# Patient Record
Sex: FEMALE | Race: WHITE | Employment: UNEMPLOYED | URBAN - METROPOLITAN AREA
[De-identification: names, ages, dates, MRNs, and addresses within clinical notes are randomized per-mention and may not be internally consistent; named-entity substitution may affect disease eponyms.]

---

## 2019-09-19 ENCOUNTER — OFFICE VISIT (OUTPATIENT)
Dept: INTERNAL MEDICINE | Age: 29
End: 2019-09-19

## 2019-09-19 ENCOUNTER — HOSPITAL ENCOUNTER (OUTPATIENT)
Age: 29
Discharge: HOME | End: 2019-09-19

## 2019-09-19 VITALS
WEIGHT: 243.1 LBS | TEMPERATURE: 97.9 F | SYSTOLIC BLOOD PRESSURE: 120 MMHG | HEART RATE: 86 BPM | RESPIRATION RATE: 16 BRPM | HEIGHT: 67 IN | BODY MASS INDEX: 38.16 KG/M2 | DIASTOLIC BLOOD PRESSURE: 74 MMHG

## 2019-09-19 DIAGNOSIS — E03.8 OTHER SPECIFIED HYPOTHYROIDISM: ICD-10-CM

## 2019-09-19 DIAGNOSIS — E03.8 OTHER SPECIFIED HYPOTHYROIDISM: Primary | ICD-10-CM

## 2019-09-19 LAB
BASOPHILS ABSOLUTE: 0.04 E9/L (ref 0–0.2)
BASOPHILS RELATIVE PERCENT: 0.6 % (ref 0–2)
EOSINOPHILS ABSOLUTE: 0.06 E9/L (ref 0.05–0.5)
EOSINOPHILS RELATIVE PERCENT: 0.9 % (ref 0–6)
HCT VFR BLD CALC: 39.7 % (ref 34–48)
HEMOGLOBIN: 12.6 G/DL (ref 11.5–15.5)
IMMATURE GRANULOCYTES #: 0.01 E9/L
IMMATURE GRANULOCYTES %: 0.2 % (ref 0–5)
LYMPHOCYTES ABSOLUTE: 2.27 E9/L (ref 1.5–4)
LYMPHOCYTES RELATIVE PERCENT: 34.7 % (ref 20–42)
MCH RBC QN AUTO: 27.4 PG (ref 26–35)
MCHC RBC AUTO-ENTMCNC: 31.7 % (ref 32–34.5)
MCV RBC AUTO: 86.3 FL (ref 80–99.9)
MONOCYTES ABSOLUTE: 0.43 E9/L (ref 0.1–0.95)
MONOCYTES RELATIVE PERCENT: 6.6 % (ref 2–12)
NEUTROPHILS ABSOLUTE: 3.73 E9/L (ref 1.8–7.3)
NEUTROPHILS RELATIVE PERCENT: 57 % (ref 43–80)
PDW BLD-RTO: 12.4 FL (ref 11.5–15)
PLATELET # BLD: 208 E9/L (ref 130–450)
PMV BLD AUTO: 10.3 FL (ref 7–12)
RBC # BLD: 4.6 E12/L (ref 3.5–5.5)
TSH SERPL DL<=0.05 MIU/L-ACNC: 1.59 UIU/ML (ref 0.27–4.2)
WBC # BLD: 6.5 E9/L (ref 4.5–11.5)

## 2019-09-19 PROCEDURE — 85025 COMPLETE CBC W/AUTO DIFF WBC: CPT

## 2019-09-19 PROCEDURE — 84443 ASSAY THYROID STIM HORMONE: CPT

## 2019-09-19 PROCEDURE — 99213 OFFICE O/P EST LOW 20 MIN: CPT | Performed by: INTERNAL MEDICINE

## 2019-09-19 PROCEDURE — 99212 OFFICE O/P EST SF 10 MIN: CPT | Performed by: INTERNAL MEDICINE

## 2019-09-19 PROCEDURE — 36415 COLL VENOUS BLD VENIPUNCTURE: CPT

## 2019-09-19 RX ORDER — LEVOTHYROXINE SODIUM 0.05 MG/1
50 TABLET ORAL DAILY
COMMUNITY
End: 2019-09-20 | Stop reason: SDUPTHER

## 2019-09-19 SDOH — HEALTH STABILITY: MENTAL HEALTH: HOW OFTEN DO YOU HAVE A DRINK CONTAINING ALCOHOL?: NEVER

## 2019-09-19 ASSESSMENT — PATIENT HEALTH QUESTIONNAIRE - PHQ9
SUM OF ALL RESPONSES TO PHQ9 QUESTIONS 1 & 2: 0
2. FEELING DOWN, DEPRESSED OR HOPELESS: 0
SUM OF ALL RESPONSES TO PHQ QUESTIONS 1-9: 0
1. LITTLE INTEREST OR PLEASURE IN DOING THINGS: 0
SUM OF ALL RESPONSES TO PHQ QUESTIONS 1-9: 0

## 2019-09-19 ASSESSMENT — ENCOUNTER SYMPTOMS
NAUSEA: 0
EYE DISCHARGE: 0
DIARRHEA: 0
COUGH: 0
BLOOD IN STOOL: 0
CONSTIPATION: 0
SHORTNESS OF BREATH: 0
ABDOMINAL PAIN: 0
VOMITING: 0
SORE THROAT: 0

## 2019-09-20 PROBLEM — E03.8 OTHER SPECIFIED HYPOTHYROIDISM: Status: ACTIVE | Noted: 2019-09-20

## 2019-09-20 RX ORDER — LEVOTHYROXINE SODIUM 0.05 MG/1
50 TABLET ORAL DAILY
Qty: 30 TABLET | Refills: 5 | Status: SHIPPED
Start: 2019-09-20 | End: 2020-08-27 | Stop reason: ALTCHOICE

## 2020-04-05 ENCOUNTER — TELEPHONE (OUTPATIENT)
Dept: PRIMARY CARE CLINIC | Age: 30
End: 2020-04-05

## 2020-08-10 ENCOUNTER — TELEPHONE (OUTPATIENT)
Dept: INTERNAL MEDICINE | Age: 30
End: 2020-08-10

## 2020-08-10 NOTE — TELEPHONE ENCOUNTER
Called patient regarding question about lab work. Lab work was done in outside facility and we do not have records at this time. She will make an appointment for regular check up and to talk to  regarding financial aid.      Electronically signed by Coretta Cordova MD on 8/10/2020 at 3:36 PM

## 2020-08-26 NOTE — PROGRESS NOTES
ft4  [facility]  Internal Medicine Residency Program  ACC Note      SUBJECTIVE:  CC: had concerns including Check-Up (had lab work done, feels  weak  and tired alot ) and Edema (both knees  left knee greater in size , warmer to touch has been going on 7-8  months ago). Jinny Moody presented to the [facility] for evaluation of weakness and bilateral knee pain    She complaints of bilateral knee pain associated with warmth and swelling for the past 6 months. She stated that she was working out on treadmill to decrease weight and ever since then she started having bilateral knee pain, more on the left knee. Knee pain along with swelling has been gradually worsening and has been interfering with her day to day activities. She reports knee stiffness in the morning and exacerbation of pain while she walks. She takes ibuprofen for the pain and it helps. She also feels more weak for the past 6 months. TSH was checked this 8/2020, which was 4.6, elevated. She is on 50 mcg of levothyroxine and claim to take levothyroxine regularly and appropriately. She denies any weight gain, nausea, vomiting, headache, dizziness, chest pain, hair loss, dry skin, hoarseness of voice.     She does not have insurance; applied for financial aid; information provided to expedite the process    Review Of Systems:  General: weakness  Ears/Nose/Throat: no hearing loss, tinnitus, vertigo, nosebleed, nasal congestion, rhinorrhea, sore throat  Respiratory: no cough, pleuritic chest pain, dyspnea, or wheezing  Cardiovascular: no chest pain, angina, dyspnea on exertion, orthopnea, PND, palpitations, or claudication  Gastrointestinal: no nausea, vomiting, heartburn, diarrhea, constipation, abdominal pain, hematochezia or melena  Genitourinary: no urinary urgency, frequency, dysuria, nocturia, hesitancy, or incontinence  Musculoskeletal: bilateral knee pain  Skin: no abnormal pigmentation, rash, itching, masses, hair or nail changes    Outpatient Medications Marked as Taking for the 8/27/20 encounter (Office Visit) with Mala Antonio MD   Medication Sig Dispense Refill    levothyroxine (SYNTHROID) 75 MCG tablet Take 1 tablet by mouth daily 90 tablet 1       I have reviewed all pertinent PMHx, PSHx, FamHx, SocialHx, medications, and allergies and updated history as appropriate. OBJECTIVE:    VS: /87   Pulse 84   Temp 98 °F (36.7 °C) (Temporal)   Resp 18   Ht 5' 6\" (1.676 m)   Wt (!) 338 lb 6.4 oz (153.5 kg)   LMP 08/20/2020   BMI 54.62 kg/m²   General appearance: Alert, Awake, Oriented times 3, no distress  Lungs: Lungs clear to auscultation bilaterally. No rhonchi, crackles or wheezes  Heart: S1 S2  Regular rate and rhythm. No rub, murmur or gallop  Abdomen: Abdomen soft but obese, non-tender. non-distended BS normal. No masses, organomegaly, no guarding rebound or rigidity.   Extremities: bilateral knee swelling, L> R, pain with movement; no ROM abnormality  Neuro: alert, awake, no gross focal neurologic deficit    ASSESSMENT/PLAN:    Bilateral knee pain  Likely 2/2 injury while working on treadmill/ due to weight  Will check Xray of bilateral knee to rule out any fracture/ dislocation  Continue Ibuprofen 3 times daily as needed    Hypothyroidism  Reports more weakness  Denies any change in voice, hair fall, dry skin  Last TSH: 4.6 (8/2020)  Increased Levothyroxine from 50 to 75 mcg daily  Reinforced again on how to take levothyroxine  Will follow up FT4 and TSH in 4-5 weeks  Follow up in 5 weeks    Morbid obesity  Life style modification    Pap smear for cervical cancer screening  - [facility]    I have reviewed my findings and recommendations with Jake Fournier and Dr Roz Perkins MD PGY-2  8/27/2020 4:08 PM

## 2020-08-27 ENCOUNTER — OFFICE VISIT (OUTPATIENT)
Dept: INTERNAL MEDICINE | Age: 30
End: 2020-08-27

## 2020-08-27 VITALS
BODY MASS INDEX: 47.09 KG/M2 | HEIGHT: 66 IN | DIASTOLIC BLOOD PRESSURE: 87 MMHG | WEIGHT: 293 LBS | TEMPERATURE: 98 F | HEART RATE: 84 BPM | RESPIRATION RATE: 18 BRPM | SYSTOLIC BLOOD PRESSURE: 128 MMHG

## 2020-08-27 PROBLEM — M25.561 CHRONIC PAIN OF BOTH KNEES: Status: ACTIVE | Noted: 2020-08-27

## 2020-08-27 PROBLEM — G89.29 CHRONIC PAIN OF BOTH KNEES: Status: ACTIVE | Noted: 2020-08-27

## 2020-08-27 PROBLEM — M25.562 CHRONIC PAIN OF BOTH KNEES: Status: ACTIVE | Noted: 2020-08-27

## 2020-08-27 PROCEDURE — 99212 OFFICE O/P EST SF 10 MIN: CPT | Performed by: INTERNAL MEDICINE

## 2020-08-27 PROCEDURE — 99213 OFFICE O/P EST LOW 20 MIN: CPT | Performed by: INTERNAL MEDICINE

## 2020-08-27 RX ORDER — LEVOTHYROXINE SODIUM 0.07 MG/1
75 TABLET ORAL DAILY
Qty: 90 TABLET | Refills: 1 | Status: SHIPPED
Start: 2020-08-27 | End: 2020-11-06 | Stop reason: SDUPTHER

## 2020-08-27 ASSESSMENT — PATIENT HEALTH QUESTIONNAIRE - PHQ9
SUM OF ALL RESPONSES TO PHQ9 QUESTIONS 1 & 2: 1
SUM OF ALL RESPONSES TO PHQ QUESTIONS 1-9: 1
1. LITTLE INTEREST OR PLEASURE IN DOING THINGS: 1
2. FEELING DOWN, DEPRESSED OR HOPELESS: 0
SUM OF ALL RESPONSES TO PHQ QUESTIONS 1-9: 1

## 2020-08-27 NOTE — PROGRESS NOTES
Patient verbalized understanding of office instructions. She will call with questions or concerns. She was given discharge instructions, scripts for X-rays of knees  And lab work . All questions were fully answered. Printed AVS given o pt.

## 2020-08-27 NOTE — PROGRESS NOTES
[facility]  Internal Medicine Clinic     Attending Physician Statement  I have discussed the case, including pertinent history and exam findings with the resident. I have seen and examined the patient and the key elements of the encounter have been performed by me. I agree with the assessment, plan and orders as documented by the resident. I have reviewed all pertinent PMHx, PSHx, FamHx, SocialHx, medications, and allergies and updated history as appropriate. Patient here for routine follow up of medical problems. Remainder of medical problems per resident's note. 1. Hypothyroidism with tiredness and weight gain. TSH elevated (results scanned). Would increase synthroid and follow up. 2. Bilateral knee pain after increase in exercise activity, mild swelling, (-) fluid, signs of infection, (-) ant post drawer sign. Discussed rest, prn nsaid. May be 2/2 to sudden increase in activity + weight gain. Flores Will MD  8/27/2020 3:49 PM

## 2020-08-27 NOTE — PATIENT INSTRUCTIONS
Discharge Instructions:   Be compliant with medications   Start taking Levothyroxine 75 mcg daily with one glass of water in empty stomach early in the morning and do not eat one hour after taking the medicine   Please have Xray knee done   Please have labs done before coming to next visit   Follow up in 5 weeks, or sooner if symptoms get worse or do not resolve    Patient Education       ÞÕæÑ ÇáÏÑÞíÉ: ÅÑÔÇÏÇÊ ÇáÑÚÇíÉ  Hypothyroidism: Care Instructions  ÅÑÔÇÏÇÊ ÇáÑÚÇíÉ ÇáÎÇÕÉ Èß  ÃäÊ ãÕÇÈ ÈÞÕæÑ ÇáÏÑÞíÉ¡ íÚäí Ðáß Ãä ÌÓãß áÇ íÝÑÒ ãÇ íßÝí ãä ÇáåÑãæä ÇáÏÑÞí. íÓÇÚÏ åÐÇ ÇáåÑãæä Ýí ÇÓÊÎÏÇã ÌÓãß ááØÇÞÉ. ÅÐÇ ßÇä ãÓÊæì ÇáÏÑÞíÉ áÏíß ãäÎÝÖðÇ¡ ÝÞÏ ÊÔÚÑ NPJZXA¡ Ãæ ÊÕÇÈ NZOTHQWO¡ Ãæ íÒíÏ ÖÛØ ÇáÏãÇÁ áÏíß¡ Ãæ ÊÕÈÍ ÈÔÑÊß ÌÇÝÉ¡ Ãæ ÊÚÇäí ãä ãÔßáÇÊ Ýí ÇáÐÇßÑÉ. ßãÇ ÞÏ ÊõÕÇÈ ÈÇáÈÑÏ ÈÓåæáÉ¡ ÍÊì ÚäÏãÇ íßæä ÇáØÞÓ ÏÇÝÆðÇ. ÈÇáäÓÈÉ ááÓíÏÇÊ ÇááæÇÊí íßæä ãÓÊæì ÇáÏÑÞíÉ ãäÎÝÖðÇ áÏíåä ÞÏ Êßæä ÇáÏæÑÉ ÇáÔåÑíÉ áÏíåä ÛÒíÑÉ. íÊã ÇÓÊÎÏÇã ÝÍÕ Ïã áãÚÑÝÉ ãÓÊæì ÇáåÑãæä ÇáãäÈå ááÏÑÞíÉ (TSH) ááÊÍÞÞ ãä æÌæÏ ÞÕæÑ ÇáÏÑÞíÉ. ÞÏ íÚäí ãÓÊæì TSH ÇáãÑÊÝÚ Ãä ÇáÏÑÞíÉ áÏíß ãäÎÝÖÉ. ÚäÏãÇ íßæä ÌÓãß áÇ íÝÑÒ ãÇ íßÝí ãä ÇáåÑãæä ÇáÏÑÞí¡ ÊÑÊÝÚ ãÓÊæíÇÊ TSH Ýí ãÍÇæáÉ áÏÝÚ ÇáÌÓã áÅäÊÇÌ ÇáãÒíÏ. ÚáÇÌ ÞÕæÑ ÇáÏÑÞíÉ åæ ÊäÇæá ÃÞÑÇÕ ÇáåÑãæä ÇáÏÑÞí. íÌÈ Ãä ÊÔÚÑ ÈÇáÊÍÓä Ýí ÛÖæä ÃÓÈæÚ Ãæ ÇËäíä. áßä íãßä Ãä íÓÊÛÑÞ ÇáÃãÑ ÚÏÉ ÃÔåÑ áÊÌÏ ÊÛíÑÇÊ Ýí ãÓÊæì TSH. ÓÊÍÊÇÌ Åáì ÇáÞíÇã ÈÒíÇÑÇÊ ãäÊÙãÉ áØÈíÈß ááÊÃßÏ Ãäß ÊÊäÇæá ÇáÌÑÚÉ ÇáÕÍíÍÉ ãä ÇáÏæÇÁ. ÃÛáÈ ÇáÃÔÎÇÕ íÍÊÇÌæä Åáì ÇáÇÓÊãÑÇÑ Úáì ÚáÇÌ áÈÞíÉ ÍíÇÊåã. ÓíÊÚíä Úáíß ÒíÇÑÉ ØÈíÈß ÈÇäÊÙÇã áÅÌÑÇÁ ÇÎÊÈÇÑÇÊ ÇáÏãÇÁ æÇáÊÃßÏ Ãäß ÈÍÇáÉ ÌíÏÉ. ÊõÚÏ ÑÚÇíÉ ÇáãÊÇÈÚÉ ÌÒÁðÇ ÃÓÇÓíðÇ Ýí ÚáÇÌß æÓáÇãÊß. ÝÚáíß ÇáÍÑÕ Úáì ÊÑÊíÈ ÌãíÚ ãæÇÚíÏ ÒíÇÑÉ ÇáØÈíÈ BRLAINDNG ÈåÇ¡ ULHHAGKP ÈØÈíÈß ÚäÏ IUPMPZLT ãä Ãí ãÔßáÇÊ. æãä ÇáÌíÏ ÃíÖðÇ Ãä ÊÚÑÝ äÊÇÆÌ FACISQBO æßÐáß OOSKKWWK ÈÞÇÆãÉ ÇáÃÏæíÉ ÇáÊí YGOYGLYZ. ßíÝ íãßäß ÇáÇÚÊäÇÁ ÈäÝÓß Ýí FAZGJV¿   ÊäÇæá ÃÏæíÉ ÇáåÑãæä ÇáÏÑÞí ÊãÇãðÇ ÍÓÈ ÊæÌíåÇÊ ÇáØÈíÈ. ÇÊÕá ÈÇáØÈíÈ ÅÐÇ ßäÊ ÊÚÊÞÏ Ãäß ÊæÇÌå ãÔßáÉ ÊÊÚáÞ ÈÇáÏæÇÁ.  ÃÛáÈ ÇáÃÔÎÇÕ áÇ íÍÏË áÏíåã ÂËÇÑ ÌÇäÈíÉ ÅÐÇ YQJXBZY ÇáßãíÉ ÇáÕÍíÍÉ ãä ÇáÏæÇÁ ÈÇäÊÙÇã. o ÊäÇæá ÇáÏæÇÁ ÞÈá 30 ÏÞíÞÉ ãä ÇáÅÝØÇÑ¡ æáÇ LMNWVYC ãÚ ÇáßÇáÓíæã¡ Ãæ ÇáÝíÊÇãíäÇÊ¡ Ãæ ÇáÍÏíÏ. o áÇ ÊÊäÇæá ÌÑÚÇÊ ÅÖÇÝíÉ ãä ÏæÇÁ ÇáÏÑÞíÉ ÇáÎÇÕ Èß. áä íÓÇÚÏß Ðáß Ýí ÇáÊÍÓä ÃÓÑÚ Úáì NBJPQSU¡ æÞÏ íÊÓÈÈ Ðáß Ýí ÍÏæË ÂËÇÑ ÌÇäÈíÉ. o ÅÐÇ äÓíÊ ÊäÇæá ÌÑÚÉ¡ ÝáÇ ÊÊäÇæá ÌÑÚÊíä ãä ÇáÏæÇÁ äåÇÆíðÇ. ÊäÇæá ÌÑÚÊß ÇáÚÇÏíÉ Ýí Çáíæã ÇáÊÇáí.  ÃÎÈÑ ØÈíÈß Èßá ÇáãäÊÌÇÊ ÇáÊí ODKVDQEC ÓæÇÁ ÈæÕÝÉ ØÈíÉ¡ Ãæ ãäÊÌÇÊ ÇáÃÚÔÇÈ¡ Ãæ ÇáãÊÇÍÉ ÈÏæä æÕÝÉ ØÈíÉ.  ÇÚÊä ÈäÝÓß. ÇÊÈÚ ÍãíÉ ÛÐÇÆíÉ ÕÍíÉ¡ æÇÍÕá Úáì Çáäæã ÇáßÇÝí¡ æãÇÑÓ ÊãÇÑíä ÈÇäÊÙÇã. ãÊì íäÈÛí áß JUTTLEQ áØáÈ ÇáãÓÇÚÏÉ¿  íÊã CRLYIQQ [id number] ÚäÏ HDYPAPFJ Ãäß ÈÍÇÌÉ Åáì ÑÚÇíÉ ØÇÑÆÉ. Úáì ÓÈíá PFCLTZ¡ ÇÊÕá Ýí PJHBNYI ÇáÊÇáíÉ:   ÅÐÇ ÃÕÈÊ ÈÇáÅÛãÇÁ (ÝÞÏÇä ÇáæÚí).  ÅÐÇ ßäÊ ÊÚÇäí ãä ÕÚæÈÉ ÔÏíÏÉ Ýí ÇáÊäÝÓ.  ÅÐÇ ßÇä ÇáäÈÖ áÏíß ÔÏíÏ ÇáÈØÁ (ÃÞá ãä 60 äÈÖÉ Ýí ÇáÏÞíÞÉ).  ÅÐÇ ßÇäÊ ÏÑÌÉ ÍÑÇÑÉ ÌÓãß ãäÎÝÖÉ (95 ÏÑÌÉ ÝåÑäåÇíÊ Ãæ ÃÞá). Úáíß ÇáÇÊÕÇá ÈØÈíÈß Úáì ÇáÝæÑ Ãæ ØáÈ ÑÚÇíÉ ØÈíÉ ÝæÑíÉ Ýí ZCNRNUR ÇáÊÇáíÉ:   ÅÐÇ ßäÊ ÊÔÚÑ ÈÅÌåÇÏ¡ Ãæ Îãæá¡ Ãæ ÖÚÝ.  ÅÐÇ ßÇäÊ áÏíß ãÔßáÉ Ýí ÊÐßÑ ÇáÃÔíÇÁ Ãæ Ýí ÇáÊÑßíÒ.  ÅÐÇ áã ÊÔÚÑ ÈÊÍÓä ÈÚÏ ÃÓÈæÚíä ãä ÈÏÁ ÊäÇæá ÇáÏæÇÁ. Úáíß ãÑÇÞÈÉ Ãí ÊÛíÑÇÊ ÊØÑÃ Úáì ÕÍÊß Úä ßËÈ XTIUKT Úáì MCINHLT ÈØÈíÈß ÅÐÇ ßÇäÊ áÏíß Ãí ãÔßáÉ. Ãíä íãßäß ãÚÑÝÉ ÇáãÒíÏ¿  ÇäÊÞÇá Åáì   [email or web address]  ÃÏÎá Luis A.Dumas Ýí ãÑÈÚ ÇáÈÍË áãÚÑÝÉ ÇáãÒíÏ Íæá \"ÞÕæÑ ÇáÏÑÞíÉ: ÅÑÔÇÏÇÊ ÇáÑÚÇíÉ. \"  Caleen Addison IQHHFJCL ãä: 29 ÊãæÒ 2019               äÓÎÉ TFYLRZU: 12.5  © 3887-4873 [employer]. Êã ÊÚÏíá ÅÑÔÇÏÇÊ ÇáÑÚÇíÉ ÈãæÌÈ ÊÑÎíÕ ÕÇÏÑ ãä ÇÎÊÕÇÕí ÇáÑÚÇíÉ ÇáÕÍíÉ ÇáÎÇÕ Èß. ÅÐÇ ßÇäÊ áÏíß ÃÓÆáÉ JPUTD ÈÍÇáÉ ãÑÖíÉ Ãæ ÈåÐå ÇáÊÚáíãÇÊ¡ ÝÇÍÑÕ Úáì ÇáÑÌæÚ ÏÇÆãðÇ Åáì ÇÎÊÕÇÕí ÇáÑÚÇíÉ ÇáÕÍíÉ. ÊõÎáí ÔÑßÉ Nymirum PMOLUDWUH Úä Ãí ÖãÇä Ãæ ÇáÊÒÇã íÊÚáÞ WLTPQQFGT áåÐå VCCPWSVMC.

## 2020-10-28 ENCOUNTER — HOSPITAL ENCOUNTER (OUTPATIENT)
Age: 30
Discharge: HOME | End: 2020-10-28

## 2020-10-28 PROCEDURE — 84443 ASSAY THYROID STIM HORMONE: CPT

## 2020-10-28 PROCEDURE — 84439 ASSAY OF FREE THYROXINE: CPT

## 2020-10-28 PROCEDURE — 36415 COLL VENOUS BLD VENIPUNCTURE: CPT

## 2020-10-29 LAB
T4 FREE: 1.53 NG/DL (ref 0.93–1.7)
TSH SERPL DL<=0.05 MIU/L-ACNC: 1.27 UIU/ML (ref 0.27–4.2)

## 2020-11-06 ENCOUNTER — OFFICE VISIT (OUTPATIENT)
Dept: INTERNAL MEDICINE | Age: 30
End: 2020-11-06

## 2020-11-06 VITALS
RESPIRATION RATE: 20 BRPM | HEART RATE: 80 BPM | TEMPERATURE: 98 F | OXYGEN SATURATION: 99 % | SYSTOLIC BLOOD PRESSURE: 129 MMHG | HEIGHT: 66 IN | BODY MASS INDEX: 37.14 KG/M2 | WEIGHT: 231.1 LBS | DIASTOLIC BLOOD PRESSURE: 82 MMHG

## 2020-11-06 PROCEDURE — 6360000002 HC RX W HCPCS

## 2020-11-06 PROCEDURE — 99213 OFFICE O/P EST LOW 20 MIN: CPT | Performed by: INTERNAL MEDICINE

## 2020-11-06 PROCEDURE — 99212 OFFICE O/P EST SF 10 MIN: CPT | Performed by: INTERNAL MEDICINE

## 2020-11-06 PROCEDURE — G0008 ADMIN INFLUENZA VIRUS VAC: HCPCS

## 2020-11-06 PROCEDURE — 90686 IIV4 VACC NO PRSV 0.5 ML IM: CPT

## 2020-11-06 RX ORDER — LEVOTHYROXINE SODIUM 0.07 MG/1
75 TABLET ORAL DAILY
Qty: 90 TABLET | Refills: 1 | Status: SHIPPED
Start: 2020-11-06 | End: 2021-04-30 | Stop reason: SDUPTHER

## 2020-11-06 NOTE — PATIENT INSTRUCTIONS
ÊÍÖíÑ ÇáØÚÇã¡ ÝÇáÚËæÑ Úáì ÎØÉ ÊÊÖãä ÞæÇÆã ææÕÝÇÊ íæãíÉ ÞÏ Êßæä ÇáÃÝÖá ÈÇáäÓÈÉ áß.  ÇÓÃá ØÈíÈß Úä ãÎÊÕíä ÕÍííä ÂÎÑíä íãßäåã ãÓÇÚÏÊß Ýí ÊÍÞíÞ ÃåÏÇÝß áÅäÞÇÕ ÇáæÒä. o íãßä Ãä íÓÇÚÏß ÃÎÕÇÆí ÇáÊÛÐíÉ Ýí Úãá ÊÛííÑÇÊ ÕÍíÉ Ýí äÙÇãß ÇáÛÐÇÆí. o íãßä Ãä íÓÇÚÏß ÃÎÕÇÆí ÇáÊãÇÑíä ÇáÑíÇÖíÉ Ãæ ÇáãÏÑÈ ÇáÔÎÕí Ýí ÊØæíÑ ÈÑäÇãÌ ÊãÇÑíä Âãä æÝÚÇá. o íãßä Ãä íÓÇÚÏß ÇáãÑÔÏ Çæ ÇáØÈíÈ ÇáäÝÓí Ýí ÇáÊÕÏí Åáì ãÔßáÇÊ ãËá OERWZIHI¡ Ãæ XJMYJRL¡ Ãæ RNVNGVXJ ÇáÃÓÑíÉ ÇáÊí íãßä Ãä ÊÕÚÈ ãä ÊÑßíÒß Úáì ÅäÞÇÕ ÇáæÒä.  ÝßÑ Ýí YOHUSKGG ÈãÌãæÚÉ ÏÚã ááÃÔÎÇÕ ÇáÐíä íÍÇæáæä ÅäÞÇÕ æÒäåã. íãßä Ãä íÞÊÑÍ ØÈíÈß ãÌãæÚÇÊ Ýí ãäØÞÊß. Ãíä íãßäß ãÚÑÝÉ ÇáãÒíÏ¿  ÇäÊÞÇá Åáì   [email or web address]  ÃÏÎá U36 Ýí ãÑÈÚ ÇáÈÍË áãÚÑÝÉ ÇáãÒíÏ Íæá \"ÈÏÁ ÎØÉ áÅäÞÇÕ ÇáæÒä: ÅÑÔÇÏÇÊ ÇáÑÚÇíÉ. \"  ÓÇÑò XHDVFPKV ãä: 11 ßÇäæä ÇáÃæá 2019               äÓÎÉ ÇáãÍÊæì: 12.6  © 4808-2543 [employer]. Êã ÊÚÏíá ÅÑÔÇÏÇÊ ÇáÑÚÇíÉ ÈãæÌÈ ÊÑÎíÕ ÕÇÏÑ ãä ÇÎÊÕÇÕí ÇáÑÚÇíÉ ÇáÕÍíÉ ÇáÎÇÕ Èß. ÅÐÇ ßÇäÊ áÏíß ÃÓÆáÉ MPNJN ÈÍÇáÉ ãÑÖíÉ Ãæ ÈåÐå ÇáÊÚáíãÇÊ¡ ÝÇÍÑÕ Úáì ÇáÑÌæÚ ÏÇÆãðÇ Åáì ÇÎÊÕÇÕí ÇáÑÚÇíÉ ÇáÕÍíÉ. ÊõÎáí ÔÑßÉ [facility] [id number] Úä Ãí ÖãÇä Ãæ ÇáÊÒÇã íÊÚáÞ [id number] áåÐå [id number].

## 2020-11-06 NOTE — PROGRESS NOTES
[facility]  Internal Medicine Clinic    Attending Physician Statement:  Luc Cole. Franchesca Johnson M.D., F.A.C.P. I have discussed the case, including pertinent history and exam findings with the resident. I agree with the assessment, plan and orders as documented by the resident. Patient here for routine follow up of medical problems. Established in clinic in 2019    Last appt in August 2020    Recently increased levothyroxine with improved symptoms    Needs levothyroxine refill today    TSH and Free T4 within normal range      Hypothyroidism    Recently adjusted levothyroxine    Symptoms of hypothyroidism improved   Continue current dosing of medications       Obesity- lifestyle changes continued   Intentional weight loss noted    Self-management goal set during encounter     HealthCare Vaccination    Previously completed influenza vaccine    Counseled on Women's health referral and PAP assessment needs- patient deferred      Remainder of medical problems as per resident note.
Patient given instruction by Dr Lea Flores. 6 month follow up scheduled. Printed AVS given to patient.
LMP 11/02/2020   SpO2 99%   BMI 37.30 kg/m²   General appearance: Alert, Awake, Oriented times 3, no distress  Lungs: Lungs clear to auscultation bilaterally. No rhonchi, crackles or wheezes  Heart: S1 S2  Regular rate and rhythm. No rub, murmur or gallop  Abdomen: Abdomen soft but obese, non-tender. non-distended BS normal. No masses, organomegaly, no guarding rebound or rigidity. Extremities: No edema, Peripheral pulses palpable 2/4  Neuro: alert, awake, no gross focal neurologic deficit    ASSESSMENT/PLAN:    Hypothyroidism  Stated that she is feeling much better  Levothyroxine increased from 50 mcg to 75 mcg in (8/2020), tolerating well  Last TSH: 4.6 (8/2020)-->1.2 (10/2020)  FT4 1.2 (9/20)--> 1.53, wnl  Will check lipid panel, BMP    Obesity class II  BMI 37.30  Lost 8 lbs in the last 6 months  Life style modification  Exercising regularly    Bilateral knee pain  Resolved    Screening for diabetes mellitus  Will check HEMOGLOBIN A1C    Encounter for vitamin deficiency screening  Will check Vitamin D 25 Hydroxy level    Health maintenance:  Discussed about PAP smear.  She stated that she would like to sort out the insurance issues first and then will see OB/GYN  FLU vaccination: 11/6/2020    I have reviewed my findings and recommendations with Pablo Zelaya and Dr Mary Wallace MD PGY-2  11/6/2020 10:40 AM

## 2021-04-09 ENCOUNTER — HOSPITAL ENCOUNTER (OUTPATIENT)
Age: 31
Discharge: HOME | End: 2021-04-09

## 2021-04-09 DIAGNOSIS — Z13.21 ENCOUNTER FOR VITAMIN DEFICIENCY SCREENING: ICD-10-CM

## 2021-04-09 DIAGNOSIS — Z13.1 SCREENING FOR DIABETES MELLITUS: ICD-10-CM

## 2021-04-09 DIAGNOSIS — E03.9 HYPOTHYROIDISM, UNSPECIFIED TYPE: ICD-10-CM

## 2021-04-09 LAB
ANION GAP SERPL CALCULATED.3IONS-SCNC: 9 MMOL/L (ref 7–16)
BUN BLDV-MCNC: 8 MG/DL (ref 6–20)
CALCIUM SERPL-MCNC: 9.1 MG/DL (ref 8.6–10.2)
CHLORIDE BLD-SCNC: 103 MMOL/L (ref 98–107)
CHOLESTEROL, TOTAL: 179 MG/DL (ref 0–199)
CO2: 25 MMOL/L (ref 22–29)
CREAT SERPL-MCNC: 0.7 MG/DL (ref 0.5–1)
GFR AFRICAN AMERICAN: >60
GFR NON-AFRICAN AMERICAN: >60 ML/MIN/1.73
GLUCOSE BLD-MCNC: 97 MG/DL (ref 74–99)
HBA1C MFR BLD: 4.7 % (ref 4–5.6)
HDLC SERPL-MCNC: 62 MG/DL
LDL CHOLESTEROL CALCULATED: 107 MG/DL (ref 0–99)
POTASSIUM SERPL-SCNC: 4.2 MMOL/L (ref 3.5–5)
SODIUM BLD-SCNC: 137 MMOL/L (ref 132–146)
TRIGL SERPL-MCNC: 51 MG/DL (ref 0–149)
VITAMIN D 25-HYDROXY: 17 NG/ML (ref 30–100)
VLDLC SERPL CALC-MCNC: 10 MG/DL

## 2021-04-09 PROCEDURE — 80048 BASIC METABOLIC PNL TOTAL CA: CPT

## 2021-04-09 PROCEDURE — 80061 LIPID PANEL: CPT

## 2021-04-09 PROCEDURE — 83036 HEMOGLOBIN GLYCOSYLATED A1C: CPT

## 2021-04-09 PROCEDURE — 82306 VITAMIN D 25 HYDROXY: CPT

## 2021-04-09 PROCEDURE — 36415 COLL VENOUS BLD VENIPUNCTURE: CPT

## 2021-04-30 ENCOUNTER — OFFICE VISIT (OUTPATIENT)
Dept: INTERNAL MEDICINE | Age: 31
End: 2021-04-30

## 2021-04-30 VITALS
BODY MASS INDEX: 36.16 KG/M2 | WEIGHT: 225 LBS | TEMPERATURE: 98 F | DIASTOLIC BLOOD PRESSURE: 80 MMHG | HEART RATE: 86 BPM | HEIGHT: 66 IN | OXYGEN SATURATION: 100 % | SYSTOLIC BLOOD PRESSURE: 120 MMHG | RESPIRATION RATE: 16 BRPM

## 2021-04-30 DIAGNOSIS — U07.1 ASYMPTOMATIC COVID-19 VIRUS INFECTION: ICD-10-CM

## 2021-04-30 DIAGNOSIS — R53.83 FATIGUE, UNSPECIFIED TYPE: Primary | ICD-10-CM

## 2021-04-30 DIAGNOSIS — R00.2 PALPITATION: ICD-10-CM

## 2021-04-30 DIAGNOSIS — E03.9 HYPOTHYROIDISM, UNSPECIFIED TYPE: ICD-10-CM

## 2021-04-30 DIAGNOSIS — E66.9 OBESITY (BMI 35.0-39.9 WITHOUT COMORBIDITY): ICD-10-CM

## 2021-04-30 DIAGNOSIS — Z00.00 HEALTHCARE MAINTENANCE: ICD-10-CM

## 2021-04-30 DIAGNOSIS — E55.9 VITAMIN D DEFICIENCY: ICD-10-CM

## 2021-04-30 PROCEDURE — 99213 OFFICE O/P EST LOW 20 MIN: CPT | Performed by: INTERNAL MEDICINE

## 2021-04-30 PROCEDURE — 99212 OFFICE O/P EST SF 10 MIN: CPT | Performed by: INTERNAL MEDICINE

## 2021-04-30 RX ORDER — LEVOTHYROXINE SODIUM 0.07 MG/1
75 TABLET ORAL DAILY
Qty: 90 TABLET | Refills: 1 | Status: SHIPPED | OUTPATIENT
Start: 2021-04-30 | End: 2022-10-12

## 2021-04-30 RX ORDER — ERGOCALCIFEROL 1.25 MG/1
50000 CAPSULE ORAL WEEKLY
Qty: 4 CAPSULE | Refills: 0 | Status: SHIPPED | OUTPATIENT
Start: 2021-04-30 | End: 2021-11-09 | Stop reason: ALTCHOICE

## 2021-04-30 ASSESSMENT — ENCOUNTER SYMPTOMS
COUGH: 0
BLOOD IN STOOL: 0
WHEEZING: 0
CHEST TIGHTNESS: 0
CONSTIPATION: 0
DIARRHEA: 0
ABDOMINAL PAIN: 0
VOMITING: 0
EYE DISCHARGE: 0
NAUSEA: 0
EYE ITCHING: 0
TROUBLE SWALLOWING: 0
RHINORRHEA: 0
SHORTNESS OF BREATH: 0

## 2021-04-30 ASSESSMENT — PATIENT HEALTH QUESTIONNAIRE - PHQ9
SUM OF ALL RESPONSES TO PHQ QUESTIONS 1-9: 0

## 2021-04-30 NOTE — PROGRESS NOTES
OBJECTIVE:    VS:   Vitals:    04/30/21 0951   BP: 120/80   Site: Right Upper Arm   Position: Sitting   Cuff Size: Large Adult   Pulse: 86   Resp: 16   Temp: 98 °F (36.7 °C)   TempSrc: Oral   SpO2: 100%   Weight: 225 lb (102.1 kg)   Height: 5' 6\" (1.676 m)     Physical Exam  Vitals signs reviewed. Constitutional:       Appearance: She is well-developed. HENT:      Head: Normocephalic and atraumatic. Right Ear: External ear normal.      Left Ear: External ear normal.   Eyes:      Conjunctiva/sclera: Conjunctivae normal.   Neck:      Musculoskeletal: Normal range of motion and neck supple. Cardiovascular:      Rate and Rhythm: Normal rate and regular rhythm. Pulses: Normal pulses. Heart sounds: Normal heart sounds. No murmur. No friction rub. No gallop. Comments: Normal S2 split, resolved with deep breath  Pulmonary:      Effort: Pulmonary effort is normal.      Breath sounds: Normal breath sounds. Abdominal:      General: Bowel sounds are normal.      Palpations: Abdomen is soft. Skin:     General: Skin is warm and dry. Capillary Refill: Capillary refill takes less than 2 seconds. Neurological:      Mental Status: She is alert and oriented to person, place, and time. ASSESSMENT/PLAN:  1. Fatigue, unspecified type  Likely 2/2 obesity, fasting, vit D deficiency vs covid19 sequela  Will rule out anemia  -     CBC; Future  -     IRON AND TIBC; Future  -     FERRITIN; Future  2. Hypothyroidism, unspecified type  -     levothyroxine (SYNTHROID) 75 MCG tablet; Take 1 tablet by mouth daily, Disp-90 tablet, R-1Normal  -     TSH without Reflex; Future  3. Asymptomatic COVID-19 virus infection  4. Palpitation  Likely 2/2 fasting  Will consider Holter if not resolved   5. Vitamin D deficiency  -     vitamin D (ERGOCALCIFEROL) 1.25 MG (12081 UT) CAPS capsule; Take 1 capsule by mouth once a week, Disp-4 capsule, R-0Normal    6. Obesity ?  OPAL   Consider sleep study  Discuss weight loss referral, pt refused     7. Healthcare maintenance  Will bring vaccination record next time  Refused HIV, HCV, low risk as well   Refuse PAPsmear. Info given       RTC:  In a month       I have reviewed my findings and recommendations with Migel Neumann and Dr. Flako Alberto.     Yves Fenton MD   4/30/2021 12:07 PM

## 2021-04-30 NOTE — PATIENT INSTRUCTIONS
- Please check blood work to rule out anemia  - We will write Vitamin D high dose for 4 weeks and see you in our office in a month  - If not resolve, we may consider Sleep study and Holter Monitoring   - Please also bring vaccination record with you next time     Patient Education        Learning About Cervical Cancer Screening  What is a cervical cancer screening test?     The cervix is the lower part of the uterus that opens into the vagina. Cervical cancer screening tests check the cells on the cervix for changes that could lead to cancer. Two tests can be used to screen for cervical cancer. They may be used alone or together. A Pap test.   This test looks for changes in the cells of the cervix. Some of these cell changes could lead to cancer. A human papillomavirus (HPV) test.   This test looks for the HPV virus. Some high-risk types of HPV can cause cell changes that could lead to cervical cancer. When should you have a screening test?  If you have a cervix, you may need cervical cancer screening. Screening will depend on many things. Ages 24 to 34  Screening options for these ages include:  · A Pap test. If your results are normal, you can wait 3 years to have another test.  · An HPV test beginning at age 22. If your results are negative, you can wait 5 years to have another test.  Ages 27 to 59  Screening options for these ages include:  · A Pap test. If your results are normal, you can wait 3 years to have another test.  · An HPV test. If your results are negative, you can wait 5 years to have another test.  · A Pap test and an HPV test. If your results are normal, you can wait 5 years to be tested again. Ages 72 and older  If you are age 72 or older and you've always had normal screening results, you may not need screening. Talk to your doctor. What do the results of cervical cancer screening mean?   Your test results may be normal. Or the results may show minor or serious changes to the cells on your

## 2021-05-03 ENCOUNTER — HOSPITAL ENCOUNTER (OUTPATIENT)
Age: 31
Discharge: HOME | End: 2021-05-03

## 2021-05-03 DIAGNOSIS — E03.9 HYPOTHYROIDISM, UNSPECIFIED TYPE: ICD-10-CM

## 2021-05-03 DIAGNOSIS — R53.83 FATIGUE, UNSPECIFIED TYPE: ICD-10-CM

## 2021-05-03 LAB
FERRITIN: 43 NG/ML
HCT VFR BLD CALC: 40.4 % (ref 34–48)
HEMOGLOBIN: 13.3 G/DL (ref 11.5–15.5)
IRON SATURATION: 16 % (ref 15–50)
IRON: 52 MCG/DL (ref 37–145)
MCH RBC QN AUTO: 28 PG (ref 26–35)
MCHC RBC AUTO-ENTMCNC: 32.9 % (ref 32–34.5)
MCV RBC AUTO: 85.1 FL (ref 80–99.9)
PDW BLD-RTO: 12.4 FL (ref 11.5–15)
PLATELET # BLD: 238 E9/L (ref 130–450)
PMV BLD AUTO: 10.1 FL (ref 7–12)
RBC # BLD: 4.75 E12/L (ref 3.5–5.5)
TOTAL IRON BINDING CAPACITY: 330 MCG/DL (ref 250–450)
TSH SERPL DL<=0.05 MIU/L-ACNC: 2.61 UIU/ML (ref 0.27–4.2)
WBC # BLD: 7 E9/L (ref 4.5–11.5)

## 2021-05-03 PROCEDURE — 85027 COMPLETE CBC AUTOMATED: CPT

## 2021-05-03 PROCEDURE — 82728 ASSAY OF FERRITIN: CPT

## 2021-05-03 PROCEDURE — 83550 IRON BINDING TEST: CPT

## 2021-05-03 PROCEDURE — 84443 ASSAY THYROID STIM HORMONE: CPT

## 2021-05-03 PROCEDURE — 36415 COLL VENOUS BLD VENIPUNCTURE: CPT

## 2021-05-03 PROCEDURE — 83540 ASSAY OF IRON: CPT

## 2021-11-09 ENCOUNTER — OFFICE VISIT (OUTPATIENT)
Dept: INTERNAL MEDICINE | Age: 31
End: 2021-11-09

## 2021-11-09 VITALS
BODY MASS INDEX: 36 KG/M2 | WEIGHT: 224 LBS | RESPIRATION RATE: 16 BRPM | SYSTOLIC BLOOD PRESSURE: 122 MMHG | DIASTOLIC BLOOD PRESSURE: 88 MMHG | TEMPERATURE: 98 F | HEIGHT: 66 IN | HEART RATE: 98 BPM

## 2021-11-09 DIAGNOSIS — Z23 FLU VACCINE NEED: ICD-10-CM

## 2021-11-09 DIAGNOSIS — E55.9 VITAMIN D DEFICIENCY: Primary | ICD-10-CM

## 2021-11-09 PROCEDURE — 90686 IIV4 VACC NO PRSV 0.5 ML IM: CPT

## 2021-11-09 PROCEDURE — G0008 ADMIN INFLUENZA VIRUS VAC: HCPCS

## 2021-11-09 PROCEDURE — 99214 OFFICE O/P EST MOD 30 MIN: CPT | Performed by: INTERNAL MEDICINE

## 2021-11-09 PROCEDURE — 99212 OFFICE O/P EST SF 10 MIN: CPT | Performed by: INTERNAL MEDICINE

## 2021-11-09 PROCEDURE — 6360000002 HC RX W HCPCS

## 2021-11-09 RX ORDER — ERGOCALCIFEROL 1.25 MG/1
50000 CAPSULE ORAL WEEKLY
Qty: 6 CAPSULE | Refills: 1 | Status: ON HOLD | OUTPATIENT
Start: 2021-11-09 | End: 2022-10-14

## 2021-11-09 NOTE — PROGRESS NOTES
BMI 36.15 kg/m²   General appearance: Alert, Awake, Oriented times 3, no distress  Lungs: Lungs clear to auscultation bilaterally. No rhonchi, crackles or wheezes  Heart: S1 S2  Regular rate and rhythm. No rub, murmur or gallop  Abdomen: Abdomen soft, non-tender. non-distended BS normal. No masses, organomegaly, no guarding rebound or rigidity. Extremities: No edema, Peripheral pulses palpable 2/4  Neuro: alert, awake, no gross focal neurologic deficit  Skin: Small about 1 cm flat, pink-colored lesion of the right buttock, with central black spot, no skin discharge, bleeding, tenderness noted    ASSESSMENT/PLAN:  Hypothyroidism  No recent weight loss or hair changes  Last TSH: 4.6 (8/2020)-->1.2 (10/2020)--> 1.52, within normal limit, (7/21)  FT4 1.2 (9/20)--> 1.53, wnl  Continue levothyroxine 75 mcg daily    Fibroadenoma  Reports left breast lump  She had mammogram and breast US done in 9/11/2021 in [place], found to have left breast upper and outer quadrant 3 cm fibroadenoma  LMP was 10/22/2021  Next mammogram next march, 2022 for evaluation of fibroadenoma     Obesity class II  BMI 36.15  Weight has been stable  Life style modification discussed  Exercising regularly    Vitamin D deficiency  Continue vitamin D 50,000 international unit weekly for 6 more doses  Recheck vitamin D level in January, 2022    Skin lesion/hemangioma? Small about 1 cm flat, pink-colored lesion of the right buttock, with central black spot, no skin discharge, bleeding, tenderness noted  Advised her to contact us for dermatology referral the lesion increases in size, any bleeding, associated with pain, tenderness, asymmetry    Flu vaccine need  INFLUENZA, QUADV, 3 YRS AND OLDER, IM PF, PREFILL SYR OR SDV, 0.5ML (AFLURIA QUADV, PF)     Health maintenance:  Discussed about PAP smear.  She stated that she would like to sort out the insurance issues first and then will see OB/GYN; deferred at this time  FLU vaccination: 11/9/2021    I have reviewed my findings and recommendations with Dhiraj Narvaez and Dr Sunita Fowler MD PGY-3  11/9/2021 10:33 AM

## 2021-11-09 NOTE — PATIENT INSTRUCTIONS
Discharge Instructions:   Be compliant with medications   Please have labs done before coming to next visit   Please take vitamin D 50,000IU weekly for 6 more doses   If skin tag of the buttock increase in size, has skin changes, bleeding, pain, please contact us for dermatology referral   Follow up in 3 months, or sooner if symptoms get worse or do not resolve    Patient Education        cholecalciferol (vitamin D3)  Pronunciation:  BAL Priest role  Brand:  Landaverde D, Ddrops, Decara, Enfamil D-Vi-Sol, Replesta, Thera-D Rapid Repletion, UpSpringbaby D, Vitamin D3  What is the most important information I should know about cholecalciferol? You should not take cholecalciferol if you have had an allergic reaction to vitamin D, or if you have high levels of calcium or vitamin D in your body, or any condition that makes it hard for your body to absorb nutrients from food (malabsorption). What is cholecalciferol? Cholecalciferol is vitamin D3. Vitamin D helps your body absorb calcium. Cholecalciferol is used as a dietary supplement in people who do not get enough vitamin D in their diets to maintain adequate health. Cholecalciferol may also be used for purposes not listed in this medication guide. What should I discuss with my healthcare provider before taking cholecalciferol? You should not use cholecalciferol if you have had an allergic reaction to vitamin D, or if you have:  · high levels of vitamin D in your body (hypervitaminosis D);  · high levels of calcium in your blood (hypercalcemia); or  · any condition that makes it hard for your body to absorb nutrients from food (malabsorption). Tell your doctor if you have ever had:  · heart disease;  · kidney disease; or  · an electrolyte imbalance. Certain forms of cholecalciferol may contain ingredients you should know about, such as peanut or soybean oil, sugar, aspartame (phenylalanine), or certain food dyes.  Ask a doctor before using cholecalciferol if you have allergies, diabetes, or phenylketonuria (PKU). Too much vitamin D could harm an unborn baby or a nursing baby. Ask a doctor before using this medicine if you are pregnant or breast-feeding. Your dose needs may be different during pregnancy or while you are nursing. Do not give cholecalciferol to a child without medical advice. Your child's dose will depend on age, weight, diet, and other factors. How should I take cholecalciferol? Follow all directions on your prescription label and read all medication guides or instruction sheets. Use only the recommended dose of cholecalciferol. It may be best to take cholecalciferol after a meal, but you may take this medicine with or without food. Measure liquid medicine carefully. Use the dosing syringe provided, or use a medicine dose-measuring device (not a kitchen spoon). The chewable tablet must be chewed before you swallow it. To take a disintegrating (Quick-Melt) tablet, place it on your tongue and do not swallow the tablet whole. Allow it to dissolve in your mouth without chewing. If desired, you may drink liquid to help swallow the dissolved tablet. The cholecalciferol wafer is usually taken only once per week or once per month. Follow your doctor's dosing instructions very carefully. The wafer must be chewed before you swallow it. While using cholecalciferol, you will need frequent blood tests. You may also need x-rays. Your doctor will determine how long to treat you with this medicine. Cholecalciferol may be only part of a complete program of treatment that also includes dietary changes and taking calcium and vitamin supplements. Follow your doctor's instructions very closely. Learn about the foods you should eat to make sure you get enough calcium and vitamin D in your diet. Your cholecalciferol dose may need to be adjusted as you make changes to your diet.   Store at room temperature away from moisture, light, and heat.  What happens if I miss a dose? Take the medicine as soon as you can, but skip the missed dose if it is almost time for your next dose. Do not take two doses at one time. What happens if I overdose? Seek emergency medical attention or call the Poison Help line at 1-102.794.8054. An overdose of vitamin D can cause serious or life-threatening side effects. Overdose symptoms may include nausea, loss of appetite, thirst, urinating more or less than usual, body aches, stiffness, confusion, or irregular heartbeats. What should I avoid while taking cholecalciferol? Ask a doctor or pharmacist before taking any multivitamins, mineral supplements, or antacids while you are taking cholecalciferol. What are the possible side effects of cholecalciferol? Get emergency medical help if you have signs of an allergic reaction: hives; difficult breathing; swelling of your face, lips, tongue, or throat. Stop taking this medicine and call your doctor at once if you have:  · chest pain, feeling short of breath;  · growth problems (in a child taking cholecalciferol); or  · early signs of vitamin D overdose --weakness, metallic taste in your mouth, weight loss, muscle or bone pain, constipation, nausea, and vomiting. Less serious side effects may be more likely, and you may have none at all. This is not a complete list of side effects and others may occur. Call your doctor for medical advice about side effects. You may report side effects to FDA at 2-905-XBN-1255. What other drugs will affect cholecalciferol? Certain medications can make it harder for your body to absorb vitamin D. If you take other medications, take them at least 2 hours before or 2 hours after you take cholecalciferol. Other drugs may affect cholecalciferol, including prescription and over-the-counter medicines, vitamins, and herbal products. Tell your doctor about all your current medicines and any medicine you start or stop using.   Where can I get more information? Your pharmacist can provide more information about cholecalciferol. Remember, keep this and all other medicines out of the reach of children, never share your medicines with others, and use this medication only for the indication prescribed. Every effort has been made to ensure that the information provided by [address] is accurate, up-to-date, and complete, but no guarantee is made to that effect. Drug information contained herein may be time sensitive. [facility] information has been compiled for use by healthcare practitioners and consumers in the [place] and therefore [facility] does not warrant that uses outside of the [place] are appropriate, unless specifically indicated otherwise. [facility]'s drug information does not endorse drugs, diagnose patients or recommend therapy. [facility]'s drug information is an informational resource designed to assist licensed healthcare practitioners in caring for their patients and/or to serve consumers viewing this service as a supplement to, and not a substitute for, the expertise, skill, knowledge and judgment of healthcare practitioners. The absence of a warning for a given drug or drug combination in no way should be construed to indicate that the drug or drug combination is safe, effective or appropriate for any given patient. [facility] does not assume any responsibility for any aspect of healthcare administered with the aid of information [facility] provides. The information contained herein is not intended to cover all possible uses, directions, precautions, warnings, drug interactions, allergic reactions, or adverse effects. If you have questions about the drugs you are taking, check with your doctor, nurse or pharmacist.  Copyright 9499-3438 [address]: 4.02. Revision date: 11/3/2020. Care instructions adapted under license by [facility] ([facility]).  If you have questions about a medical condition or this instruction, always ask your healthcare professional. Bryan Flores any warranty or liability for your use of this information. Patient Education        DASH Diet: Care Instructions  Your Care Instructions     The DASH diet is an eating plan that can help lower your blood pressure. DASH stands for Dietary Approaches to Stop Hypertension. Hypertension is high blood pressure. The DASH diet focuses on eating foods that are high in calcium, potassium, and magnesium. These nutrients can lower blood pressure. The foods that are highest in these nutrients are fruits, vegetables, low-fat dairy products, nuts, seeds, and legumes. But taking calcium, potassium, and magnesium supplements instead of eating foods that are high in those nutrients does not have the same effect. The DASH diet also includes whole grains, fish, and poultry. The DASH diet is one of several lifestyle changes your doctor may recommend to lower your high blood pressure. Your doctor may also want you to decrease the amount of sodium in your diet. Lowering sodium while following the DASH diet can lower blood pressure even further than just the DASH diet alone. Follow-up care is a key part of your treatment and safety. Be sure to make and go to all appointments, and call your doctor if you are having problems. It's also a good idea to know your test results and keep a list of the medicines you take. How can you care for yourself at home? Following the DASH diet  · Eat 4 to 5 servings of fruit each day. A serving is 1 medium-sized piece of fruit, ½ cup chopped or canned fruit, 1/4 cup dried fruit, or 4 ounces (½ cup) of fruit juice. Choose fruit more often than fruit juice. · Eat 4 to 5 servings of vegetables each day. A serving is 1 cup of lettuce or raw leafy vegetables, ½ cup of chopped or cooked vegetables, or 4 ounces (½ cup) of vegetable juice. Choose vegetables more often than vegetable juice.   · Get 2 to 3 servings of low-fat and fat-free dairy each day. A serving is 8 ounces of milk, 1 cup of yogurt, or 1 ½ ounces of cheese. · Eat 6 to 8 servings of grains each day. A serving is 1 slice of bread, 1 ounce of dry cereal, or ½ cup of cooked rice, pasta, or cooked cereal. Try to choose whole-grain products as much as possible. · Limit lean meat, poultry, and fish to 2 servings each day. A serving is 3 ounces, about the size of a deck of cards. · Eat 4 to 5 servings of nuts, seeds, and legumes (cooked dried beans, lentils, and split peas) each week. A serving is 1/3 cup of nuts, 2 tablespoons of seeds, or ½ cup of cooked beans or peas. · Limit fats and oils to 2 to 3 servings each day. A serving is 1 teaspoon of vegetable oil or 2 tablespoons of salad dressing. · Limit sweets and added sugars to 5 servings or less a week. A serving is 1 tablespoon jelly or jam, ½ cup sorbet, or 1 cup of lemonade. · Eat less than 2,300 milligrams (mg) of sodium a day. If you limit your sodium to 1,500 mg a day, you can lower your blood pressure even more. · Be aware that all of these are the suggested number of servings for people who eat 1,800 to 2,000 calories a day. Your recommended number of servings may be different if you need more or fewer calories. Tips for success  · Start small. Do not try to make dramatic changes to your diet all at once. You might feel that you are missing out on your favorite foods and then be more likely to not follow the plan. Make small changes, and stick with them. Once those changes become habit, add a few more changes. · Try some of the following:  ? Make it a goal to eat a fruit or vegetable at every meal and at snacks. This will make it easy to get the recommended amount of fruits and vegetables each day. ? Try yogurt topped with fruit and nuts for a snack or healthy dessert. ? Add lettuce, tomato, cucumber, and onion to sandwiches.   ? Combine a ready-made pizza crust with low-fat mozzarella cheese and lots of vegetable toppings. Try using tomatoes, squash, spinach, broccoli, carrots, cauliflower, and onions. ? Have a variety of cut-up vegetables with a low-fat dip as an appetizer instead of chips and dip. ? Sprinkle sunflower seeds or chopped almonds over salads. Or try adding chopped walnuts or almonds to cooked vegetables. ? Try some vegetarian meals using beans and peas. Add garbanzo or kidney beans to salads. Make burritos and tacos with mashed frederick beans or black beans. Where can you learn more? Go to https://[email or web address].[email or web address]. org and sign in to your clipsync account. Enter H757 in the Pinger box to learn more about \"DASH Diet: Care Instructions. \"     If you do not have an account, please click on the \"Sign Up Now\" link. Current as of: April 29, 2021               Content Version: 13.0  © 2006-2021 Healthwise, Incorporated. Care instructions adapted under license by [facility] ([facility]). If you have questions about a medical condition or this instruction, always ask your healthcare professional. [place] [address] any warranty or liability for your use of this information.

## 2022-05-11 LAB — ABO, EXTERNAL RESULT: NORMAL

## 2022-06-09 ENCOUNTER — INITIAL PRENATAL (OUTPATIENT)
Dept: OBGYN | Age: 32
End: 2022-06-09
Payer: MEDICAID

## 2022-06-09 VITALS
BODY MASS INDEX: 40.03 KG/M2 | SYSTOLIC BLOOD PRESSURE: 131 MMHG | DIASTOLIC BLOOD PRESSURE: 82 MMHG | HEART RATE: 72 BPM | WEIGHT: 248 LBS

## 2022-06-09 DIAGNOSIS — Z3A.21 21 WEEKS GESTATION OF PREGNANCY: Primary | ICD-10-CM

## 2022-06-09 LAB
GLUCOSE URINE, POC: NORMAL
PROTEIN UA: NEGATIVE

## 2022-06-09 PROCEDURE — 81002 URINALYSIS NONAUTO W/O SCOPE: CPT | Performed by: OBSTETRICS & GYNECOLOGY

## 2022-06-09 PROCEDURE — 99204 OFFICE O/P NEW MOD 45 MIN: CPT | Performed by: OBSTETRICS & GYNECOLOGY

## 2022-06-09 PROCEDURE — 99203 OFFICE O/P NEW LOW 30 MIN: CPT

## 2022-06-09 PROCEDURE — 99203 OFFICE O/P NEW LOW 30 MIN: CPT | Performed by: OBSTETRICS & GYNECOLOGY

## 2022-06-09 RX ORDER — ASPIRIN 81 MG/1
81 TABLET, CHEWABLE ORAL DAILY
Status: ON HOLD | COMMUNITY
End: 2022-10-18 | Stop reason: HOSPADM

## 2022-06-09 NOTE — PROGRESS NOTES
Josie Escobedo is a 22-year-old  at 21 weeks and 4 days confirmed by an ultrasound on May 26, 2020 stating she was 19 weeks. She is here from [facility] and has been here since 2018. She is currently here with her father. Her  is in [facility] working as an . She is requesting a note that he be able to return home for the pregnancy and delivery. She also needs a note stating that she can fly back to [facility] while she is pregnant. She denies any anxiety depression or suicidal ideation. She has no history of any physical sexual or verbal abuse. She is a non-smoker. She had nausea and vomiting during the first 2 months but is eating better and sleeping better. 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 She has had minimal prenatal care thus far and has had only minimal blood work performed. She is taking Synthroid, baby aspirin and a multivitamin. She has noticed fetal movement periodically. Denies bleeding leaking of fluid  Heart is regular rate and rhythm  Lungs are clear  Abdomen is soft nontender no guarding no rebound  Uterus is at the umbilicus  Fetal heart tones 150s  Impression is 21 weeks gestation  Reviewed first and second trimester changes. Note provided for return to [facility] and for her  to return here  Prenatal labs were ordered  Reviewed NIPT testing however she currently has no insurance. She is too late for the quad screen.

## 2022-06-09 NOTE — PATIENT INSTRUCTIONS
Please arrive for your scheduled appointment at least 15 minutes early with your actual insurance card+ a photo ID. Also if you need any refills ordered or have questions, it may take up 48 hours to reply. Please allow ample time for your refills. Call me when you use last refill. Thank you for your cooperation. You might be having an NST at your next appt. Please eat a large snack or breakfast before coming to office. Thank youCall your primary obstetrician with bleeding, leaking of fluid, abdominal tenderness, headache, blurry vision, epigastric pain and increased urinary frequency. If you are experiencing an emergency and need immediate help, call 911 or go to go emergency room or labor and delivery. Do kick counts after dinner. Call your primary obstetrician if less than 10 kicks in 2 hours after dinner. Call your primary obstetrician with bleeding, leaking of fluid, abdominal tenderness, headache, blurry vision, epigastric pain and increased urinary frequency. Do kick counts after dinner. Call your primary obstetrician if less than 10 kicks in 2 hours after dinner. Call your primary obstetrician with bleeding, leaking of fluid, abdominal tenderness, headache, blurry vision, epigastric pain and increased urinary frequency. Do kick counts after dinner. Call your primary obstetrician if less than 10 kicks in 2 hours after dinner. Call your primary obstetrician with bleeding, leaking of fluid, abdominal tenderness, headache, blurry vision, epigastric pain and increased urinary frequency.

## 2022-06-10 ENCOUNTER — HOSPITAL ENCOUNTER (OUTPATIENT)
Age: 32
Discharge: HOME | End: 2022-06-10
Payer: MEDICAID

## 2022-06-10 DIAGNOSIS — Z3A.21 21 WEEKS GESTATION OF PREGNANCY: ICD-10-CM

## 2022-06-10 LAB
FERRITIN: 35 NG/ML
GLUCOSE TOLERANCE TEST 1 HOUR: 112 MG/DL
GLUCOSE TOLERANCE TEST 2 HOUR: 101 MG/DL
GLUCOSE TOLERANCE TEST FASTING: 79 MG/DL
HCT VFR BLD CALC: 33 % (ref 34–48)
HEMOGLOBIN: 10.9 G/DL (ref 11.5–15.5)
MCH RBC QN AUTO: 29.4 PG (ref 26–35)
MCHC RBC AUTO-ENTMCNC: 33 % (ref 32–34.5)
MCV RBC AUTO: 88.9 FL (ref 80–99.9)
PDW BLD-RTO: 13.8 FL (ref 11.5–15)
PLATELET # BLD: 198 E9/L (ref 130–450)
PMV BLD AUTO: 9.9 FL (ref 7–12)
RBC # BLD: 3.71 E12/L (ref 3.5–5.5)
WBC # BLD: 8.8 E9/L (ref 4.5–11.5)

## 2022-06-10 PROCEDURE — 82728 ASSAY OF FERRITIN: CPT

## 2022-06-10 PROCEDURE — 87340 HEPATITIS B SURFACE AG IA: CPT

## 2022-06-10 PROCEDURE — 86803 HEPATITIS C AB TEST: CPT

## 2022-06-10 PROCEDURE — 36415 COLL VENOUS BLD VENIPUNCTURE: CPT

## 2022-06-10 PROCEDURE — 86703 HIV-1/HIV-2 1 RESULT ANTBDY: CPT

## 2022-06-10 PROCEDURE — 85027 COMPLETE CBC AUTOMATED: CPT

## 2022-06-10 PROCEDURE — 82951 GLUCOSE TOLERANCE TEST (GTT): CPT

## 2022-06-10 PROCEDURE — 86762 RUBELLA ANTIBODY: CPT

## 2022-06-10 PROCEDURE — 86592 SYPHILIS TEST NON-TREP QUAL: CPT

## 2022-06-13 LAB
HEPATITIS B SURFACE ANTIGEN INTERPRETATION: NORMAL
HEPATITIS C ANTIBODY INTERPRETATION: NORMAL
HIV-1 AND HIV-2 ANTIBODIES: NORMAL
RPR: NORMAL
RUBELLA ANTIBODY IGG: NORMAL

## 2022-06-14 ENCOUNTER — TELEPHONE (OUTPATIENT)
Dept: OBGYN | Age: 32
End: 2022-06-14

## 2022-06-14 DIAGNOSIS — O99.019 ANEMIA AFFECTING FIRST PREGNANCY: Primary | ICD-10-CM

## 2022-06-14 PROBLEM — E03.8 OTHER SPECIFIED HYPOTHYROIDISM: Status: RESOLVED | Noted: 2019-09-20 | Resolved: 2022-06-14

## 2022-06-14 RX ORDER — FERROUS SULFATE 325(65) MG
325 TABLET ORAL EVERY OTHER DAY
Qty: 30 TABLET | Refills: 5 | Status: SHIPPED
Start: 2022-06-14 | End: 2022-08-24 | Stop reason: SDUPTHER

## 2022-06-14 NOTE — TELEPHONE ENCOUNTER
----- Message from Lian Calabrese DO sent at 6/14/2022  8:12 AM EDT -----  Let her know her iron is low and I have sent in a prescription to [facility] for iron supplementation to be taken every other day and I have also placed an order for a hemoglobin hematocrit to be done in 2 weeks after she starts the medication.

## 2022-06-14 NOTE — PROGRESS NOTES
Hemoglobin is low and I need her to start iron supplementation every other day for the remainder of the pregnancy and in 2 weeks from starting the medication she needs to get another blood test done to check

## 2022-06-14 NOTE — TELEPHONE ENCOUNTER
Called pt reviewed dr. Freya Calderon recommendations with pt. She is to start iron supplement qod and once on iron for 2 weeks get her labs redrawn. Pt voiced understanding.

## 2022-06-23 ENCOUNTER — TELEPHONE (OUTPATIENT)
Dept: OBGYN | Age: 32
End: 2022-06-23

## 2022-06-23 NOTE — TELEPHONE ENCOUNTER
Lara Blanco is traveling to [facility], overseas on July 13, 2022, returning August 21. She asked Nathaly Sebastian to call [facility] and request a letter stating she can travel. The flight is about 15 hours with a layover. Nathaly Sebastian is on her communication consent form  - [phone]. Lara Blanco is 23w4d. [employer]'s phone number is [phone].

## 2022-06-24 NOTE — TELEPHONE ENCOUNTER
I called Nydia Martin and she is asking for the letter to include a statement saying she does not have any complications. Is it okay to include this in the letter? Her next appointment is on august 25th. Does she need to see you before she leaves?

## 2022-08-24 ENCOUNTER — HOSPITAL ENCOUNTER (OUTPATIENT)
Age: 32
Discharge: HOME | End: 2022-08-24
Payer: MEDICAID

## 2022-08-24 DIAGNOSIS — O99.019 ANEMIA AFFECTING FIRST PREGNANCY: ICD-10-CM

## 2022-08-24 LAB
HCT VFR BLD CALC: 34.3 % (ref 34–48)
HEMOGLOBIN: 11.9 G/DL (ref 11.5–15.5)

## 2022-08-24 PROCEDURE — 85018 HEMOGLOBIN: CPT

## 2022-08-24 PROCEDURE — 36415 COLL VENOUS BLD VENIPUNCTURE: CPT

## 2022-08-24 PROCEDURE — 85014 HEMATOCRIT: CPT

## 2022-08-25 ENCOUNTER — ROUTINE PRENATAL (OUTPATIENT)
Dept: OBGYN | Age: 32
End: 2022-08-25
Payer: MEDICAID

## 2022-08-25 VITALS
SYSTOLIC BLOOD PRESSURE: 124 MMHG | DIASTOLIC BLOOD PRESSURE: 80 MMHG | WEIGHT: 234 LBS | HEART RATE: 98 BPM | BODY MASS INDEX: 37.77 KG/M2

## 2022-08-25 DIAGNOSIS — O09.33 LIMITED PRENATAL CARE IN THIRD TRIMESTER: ICD-10-CM

## 2022-08-25 DIAGNOSIS — Z3A.32 32 WEEKS GESTATION OF PREGNANCY: Primary | ICD-10-CM

## 2022-08-25 LAB
GLUCOSE URINE, POC: NORMAL
PROTEIN UA: NEGATIVE

## 2022-08-25 PROCEDURE — 99213 OFFICE O/P EST LOW 20 MIN: CPT | Performed by: OBSTETRICS & GYNECOLOGY

## 2022-08-25 PROCEDURE — 81002 URINALYSIS NONAUTO W/O SCOPE: CPT | Performed by: OBSTETRICS & GYNECOLOGY

## 2022-08-25 RX ORDER — VITAMIN A ACETATE, BETA CAROTENE, ASCORBIC ACID, CHOLECALCIFEROL, .ALPHA.-TOCOPHEROL ACETATE, DL-, THIAMINE MONONITRATE, RIBOFLAVIN, NIACINAMIDE, PYRIDOXINE HYDROCHLORIDE, FOLIC ACID, CYANOCOBALAMIN, CALCIUM CARBONATE, FERROUS FUMARATE, ZINC OXIDE, CUPRIC OXIDE 3080; 12; 120; 400; 1; 1.84; 3; 20; 22; 920; 25; 200; 27; 10; 2 [IU]/1; UG/1; MG/1; [IU]/1; MG/1; MG/1; MG/1; MG/1; MG/1; [IU]/1; MG/1; MG/1; MG/1; MG/1; MG/1
1 TABLET, FILM COATED ORAL DAILY
Qty: 90 TABLET | Refills: 3 | Status: SHIPPED | OUTPATIENT
Start: 2022-08-25 | End: 2022-11-23

## 2022-08-25 NOTE — PROGRESS NOTES
Airway  Performed by: Licha Dolan MD  Authorized by: Licha Dolan MD     Final Airway Type:  Endotracheal airway  Final Endotracheal Airway*:  ETT and DIAMOND tube  ETT Size (mm)*:  5.0  Cuff*:  Regular  Technique Used for Successful ETT Placement:  Direct laryngoscopy  Devices/Methods Used in Placement*:  Mask and Nasal Airway  Intubation Procedure*:  Preoxygenation, ETCO2, Atraumatic, Dentition Unchanged and Pharynx Clear  Insertion Site:  Oral  Blade Type*:  MAC  Blade Size*:  2  Placement Verified by: auscultation and capnometry    Glottic View*:  1 - full view of glottis  Attempts*:  1   Patient Identified, Procedure confirmed, Emergency equipment available and Safety protocols followed  Location:  OR  Urgency:  Elective  Difficult Airway: No    Indications for Airway Management:  Anesthesia  Sedation Level:  Anesthetized  Mask Difficulty Assessment:  1 - vent by mask  Performed By:  Anesthesiologist  Anesthesiologist:  Licha Dolan MD  Start Time: 3/10/2022 12:15 PM   One spray of Afrin applied to each nare. 5.0 cuffed nasal Diamond ETT inserted into right nare with minimal resistance. Direct laryngoscopy performed with MAC 2. Grade 1 view. 5.0 cuffed nasal DIAMOND advanced into trachea easily with use of Magill forceps        
Glaucoma suspect OU
Is back from [facility]. Reports her  will not be able to join her. Mother will be present for delivery. Denies HA, BLD, LO F or CTN's. Good FM.   Area under right breast is a skin tag no need to address
roiyinr prenatal  +fm  Pt denies lof vag bleeding or severe abd pain  Pt has an area would like checked underher right breast
none

## 2022-08-25 NOTE — PATIENT INSTRUCTIONS
You might be having an NST at your next appt. Please eat a large snack or breakfast before coming to office. Thank you If you are experiencing an emergency and need immediate help, call 911 or go to go emergency room or labor and delivery. If you are experiencing an emergency and need immediate help, call 911 or go to go emergency room or labor and delivery. If you are experiencing an emergency and need immediate help, call 911 or go to go emergency room or labor and delivery.

## 2022-08-29 DIAGNOSIS — O99.019 ANEMIA AFFECTING FIRST PREGNANCY: ICD-10-CM

## 2022-08-30 DIAGNOSIS — O99.019 ANEMIA AFFECTING FIRST PREGNANCY: ICD-10-CM

## 2022-08-30 RX ORDER — FERROUS SULFATE 325(65) MG
325 TABLET ORAL EVERY OTHER DAY
Qty: 30 TABLET | Refills: 5 | Status: SHIPPED
Start: 2022-08-30 | End: 2022-08-30 | Stop reason: SDUPTHER

## 2022-08-30 RX ORDER — FERROUS SULFATE 325(65) MG
325 TABLET ORAL EVERY OTHER DAY
Qty: 30 TABLET | Refills: 5 | Status: ON HOLD | OUTPATIENT
Start: 2022-08-30 | End: 2022-10-17

## 2022-08-31 ENCOUNTER — HOSPITAL ENCOUNTER (OUTPATIENT)
Age: 32
Setting detail: OBSERVATION
Discharge: HOME | End: 2022-08-31
Attending: OBSTETRICS & GYNECOLOGY | Admitting: STUDENT IN AN ORGANIZED HEALTH CARE EDUCATION/TRAINING PROGRAM
Payer: MEDICAID

## 2022-08-31 VITALS — HEART RATE: 81 BPM | DIASTOLIC BLOOD PRESSURE: 71 MMHG | TEMPERATURE: 98.2 F | SYSTOLIC BLOOD PRESSURE: 122 MMHG

## 2022-08-31 PROBLEM — O47.00 PRETERM CONTRACTIONS: Status: ACTIVE | Noted: 2022-08-31

## 2022-08-31 LAB
BACTERIA: ABNORMAL /HPF
BILIRUBIN URINE: NEGATIVE
BLOOD, URINE: NEGATIVE
CLARITY: CLEAR
COLOR: YELLOW
EPITHELIAL CELLS, UA: ABNORMAL /HPF
FETAL FIBRONECTIN: NEGATIVE
GLUCOSE URINE: NEGATIVE MG/DL
KETONES, URINE: NEGATIVE MG/DL
LEUKOCYTE ESTERASE, URINE: ABNORMAL
MUCUS: PRESENT /LPF
NITRITE, URINE: NEGATIVE
PH UA: 6.5 (ref 5–9)
PROTEIN UA: NEGATIVE MG/DL
RBC UA: ABNORMAL /HPF (ref 0–2)
SPECIFIC GRAVITY UA: 1.02 (ref 1–1.03)
UROBILINOGEN, URINE: 0.2 E.U./DL
WBC UA: ABNORMAL /HPF (ref 0–5)

## 2022-08-31 PROCEDURE — 96372 THER/PROPH/DIAG INJ SC/IM: CPT

## 2022-08-31 PROCEDURE — 6360000002 HC RX W HCPCS: Performed by: STUDENT IN AN ORGANIZED HEALTH CARE EDUCATION/TRAINING PROGRAM

## 2022-08-31 PROCEDURE — 99219 PR INITIAL OBSERVATION CARE/DAY 50 MINUTES: CPT | Performed by: NURSE PRACTITIONER

## 2022-08-31 PROCEDURE — 96361 HYDRATE IV INFUSION ADD-ON: CPT

## 2022-08-31 PROCEDURE — 81001 URINALYSIS AUTO W/SCOPE: CPT

## 2022-08-31 PROCEDURE — G0378 HOSPITAL OBSERVATION PER HR: HCPCS

## 2022-08-31 PROCEDURE — 82731 ASSAY OF FETAL FIBRONECTIN: CPT

## 2022-08-31 PROCEDURE — 2580000003 HC RX 258: Performed by: NURSE PRACTITIONER

## 2022-08-31 PROCEDURE — 96360 HYDRATION IV INFUSION INIT: CPT

## 2022-08-31 RX ORDER — SODIUM CHLORIDE, SODIUM LACTATE, POTASSIUM CHLORIDE, AND CALCIUM CHLORIDE .6; .31; .03; .02 G/100ML; G/100ML; G/100ML; G/100ML
500 INJECTION, SOLUTION INTRAVENOUS ONCE
Status: COMPLETED | OUTPATIENT
Start: 2022-08-31 | End: 2022-08-31

## 2022-08-31 RX ORDER — SODIUM CHLORIDE, SODIUM LACTATE, POTASSIUM CHLORIDE, CALCIUM CHLORIDE 600; 310; 30; 20 MG/100ML; MG/100ML; MG/100ML; MG/100ML
INJECTION, SOLUTION INTRAVENOUS CONTINUOUS
Status: DISCONTINUED | OUTPATIENT
Start: 2022-08-31 | End: 2022-08-31 | Stop reason: HOSPADM

## 2022-08-31 RX ORDER — TERBUTALINE SULFATE 1 MG/ML
0.25 INJECTION, SOLUTION SUBCUTANEOUS ONCE
Status: COMPLETED | OUTPATIENT
Start: 2022-08-31 | End: 2022-08-31

## 2022-08-31 RX ADMIN — TERBUTALINE SULFATE 0.25 MG: 1 INJECTION, SOLUTION SUBCUTANEOUS at 15:59

## 2022-08-31 RX ADMIN — SODIUM CHLORIDE, POTASSIUM CHLORIDE, SODIUM LACTATE AND CALCIUM CHLORIDE: 600; 310; 30; 20 INJECTION, SOLUTION INTRAVENOUS at 14:03

## 2022-08-31 RX ADMIN — SODIUM CHLORIDE, POTASSIUM CHLORIDE, SODIUM LACTATE AND CALCIUM CHLORIDE 1000 ML: 600; 310; 30; 20 INJECTION, SOLUTION INTRAVENOUS at 13:02

## 2022-08-31 NOTE — PROGRESS NOTES
33.3 presents with c/o generalized abdominal pain with lower back pain since 0500. Denies vb, lof,  +fetal; movement.

## 2022-08-31 NOTE — DISCHARGE INSTRUCTIONS
Home Undelivered Discharge Instructions    After Discharge Orders:    Future Appointments   Date Time Provider Wes Selfi   9/8/2022  9:40 AM Mike Vasquez DO [facility]       Call physician or midwife's office on *** for instructions. Diet:  normal diet as tolerated    Rest: normal activity as tolerated    Other instructions: Do kick counts once a day on your baby. Choose the time of day your baby is most active. Get in a comfortable lying or sitting position and time how long it takes to feel 10 kicks, twists, turns, swishes, or rolls.  Call your physician or midwife if there have not been 10 kicks in 2 hours    Call physician or midwife, return to Labor and Delivery, call 911, or go to the nearest Emergency Room if: contractions more than  6 per  1 hour

## 2022-08-31 NOTE — H&P
Department of Obstetrics and Gynecology  Labor and Delivery  Triage Note      SUBJECTIVE:  Irene Kumar is a 28 y.o. female, , Patient's last menstrual period was 2022., Estimated Date of Delivery: 10/16/22, 33w3d, here with the complaint of abdominal pain and back pain. Pt denies vb, lof, or decreased fm. Pt denies urinary sx.       Prenatal course: uneventful    Review of Systems:   Ears, nose, mouth, throat, and face: negative  Respiratory: negative  Cardiovascular: negative  Gastrointestinal: positive for abdominal pain  Genitourinary:negative  Integument/breast: negative  Hematologic/lymphatic: negative  Musculoskeletal:positive for back pain  Neurological: negative  Behavioral/Psych: negative  Endocrine: negative  Allergic/Immunologic: negative    OBJECTIVE    Vitals:  /71   Pulse 81   Temp 98.2 °F (36.8 °C) (Oral)   LMP 2022     General- alert, NAD  Skin- warm and dry, no rashes seen  Psych- normal mood and affect  Neuro- CN II-XII grossly intact  Abdomen: soft, nontender  Cervix closed/75%/-2    Fetal heart rate:         Baseline Heart Rate:  150        Accelerations:  present       Decelerations:  absent       Variability:  moderate    Contraction frequency: q2-3 minutes    ASSESSMENT:    Threatened  labor    Plan: d/w Dr. Bárbara Low  Observation  Urinalysis  Iv hydration  FFN      FFN negative  1 dose of terb given, which stopped contractions, patient feeling much better  NST reactive cat 1 tracing  SVE still closed/20/-2  Okay to be dc home, encouraged to return if cxs return or she experiences vaginal bleeding, LOF, or decreased FM    Jerel Foreman

## 2022-09-08 ENCOUNTER — ROUTINE PRENATAL (OUTPATIENT)
Dept: OBGYN | Age: 32
End: 2022-09-08
Payer: MEDICAID

## 2022-09-08 VITALS
HEART RATE: 82 BPM | WEIGHT: 260 LBS | SYSTOLIC BLOOD PRESSURE: 120 MMHG | BODY MASS INDEX: 41.97 KG/M2 | DIASTOLIC BLOOD PRESSURE: 68 MMHG

## 2022-09-08 DIAGNOSIS — Z3A.34 34 WEEKS GESTATION OF PREGNANCY: Primary | ICD-10-CM

## 2022-09-08 LAB
GLUCOSE URINE, POC: NEGATIVE
PROTEIN UA: NEGATIVE

## 2022-09-08 PROCEDURE — 81002 URINALYSIS NONAUTO W/O SCOPE: CPT | Performed by: OBSTETRICS & GYNECOLOGY

## 2022-09-08 PROCEDURE — 99213 OFFICE O/P EST LOW 20 MIN: CPT | Performed by: OBSTETRICS & GYNECOLOGY

## 2022-09-08 RX ORDER — FERROUS SULFATE 325(65) MG
325 TABLET ORAL EVERY OTHER DAY
Qty: 30 TABLET | Refills: 5 | OUTPATIENT
Start: 2022-09-08

## 2022-09-08 NOTE — PROGRESS NOTES
Was in hospital 8-31 for contractions given terbutaline IV hydration. Today no CTN's, BLD or LO F.  Good FM. Just reports pulling and stretching difficulty with ambulating.

## 2022-09-22 ENCOUNTER — ROUTINE PRENATAL (OUTPATIENT)
Dept: OBGYN | Age: 32
End: 2022-09-22
Payer: MEDICAID

## 2022-09-22 VITALS
SYSTOLIC BLOOD PRESSURE: 124 MMHG | BODY MASS INDEX: 42.61 KG/M2 | HEART RATE: 98 BPM | WEIGHT: 264 LBS | DIASTOLIC BLOOD PRESSURE: 78 MMHG

## 2022-09-22 DIAGNOSIS — Z3A.36 36 WEEKS GESTATION OF PREGNANCY: ICD-10-CM

## 2022-09-22 DIAGNOSIS — Z3A.36 36 WEEKS GESTATION OF PREGNANCY: Primary | ICD-10-CM

## 2022-09-22 DIAGNOSIS — Z3A.34 34 WEEKS GESTATION OF PREGNANCY: ICD-10-CM

## 2022-09-22 LAB
GLUCOSE URINE, POC: NEGATIVE
GLUCOSE URINE, POC: NEGATIVE
PROTEIN UA: NEGATIVE
PROTEIN UA: NEGATIVE

## 2022-09-22 PROCEDURE — 81002 URINALYSIS NONAUTO W/O SCOPE: CPT | Performed by: OBSTETRICS & GYNECOLOGY

## 2022-09-22 PROCEDURE — 99213 OFFICE O/P EST LOW 20 MIN: CPT | Performed by: OBSTETRICS & GYNECOLOGY

## 2022-09-22 NOTE — PATIENT INSTRUCTIONS
Please arrive for your scheduled appointment at least 15 minutes early with your actual insurance card+ a photo ID. Also if you need any refills ordered or have questions, it may take up 48 hours to reply. Please allow ample time for your refills. Call me when you use last refill. Thank you for your cooperation. You might be having an NST at your next appt. Please eat a large snack or breakfast before coming to office. Thank you Call your primary obstetrician with bleeding, leaking of fluid, abdominal tenderness, headache, blurry vision, epigastric pain and increased urinary frequency. If you are experiencing an emergency and need immediate help, call 911 or go to go emergency room or labor and delivery. Do kick counts after dinner. Call your primary obstetrician if less than 10 kicks in 2 hours after dinner. Call your primary obstetrician with bleeding, leaking of fluid, abdominal tenderness, headache, blurry vision, epigastric pain and increased urinary frequency.

## 2022-09-26 LAB — GROUP B STREP CULTURE: NORMAL

## 2022-09-28 ENCOUNTER — ROUTINE PRENATAL (OUTPATIENT)
Dept: OBGYN | Age: 32
End: 2022-09-28
Payer: MEDICAID

## 2022-09-28 VITALS
BODY MASS INDEX: 42.29 KG/M2 | HEART RATE: 83 BPM | WEIGHT: 262 LBS | DIASTOLIC BLOOD PRESSURE: 85 MMHG | SYSTOLIC BLOOD PRESSURE: 129 MMHG

## 2022-09-28 DIAGNOSIS — Z3A.37 37 WEEKS GESTATION OF PREGNANCY: Primary | ICD-10-CM

## 2022-09-28 LAB
GLUCOSE URINE, POC: NEGATIVE
PROTEIN UA: NEGATIVE

## 2022-09-28 PROCEDURE — 99213 OFFICE O/P EST LOW 20 MIN: CPT | Performed by: OBSTETRICS & GYNECOLOGY

## 2022-09-28 PROCEDURE — 81002 URINALYSIS NONAUTO W/O SCOPE: CPT | Performed by: OBSTETRICS & GYNECOLOGY

## 2022-09-28 NOTE — PROGRESS NOTES
No S&S of PIH. No CTN's, BLD or LO F.   Reviewed GBS negative no exam today reviewed labor delivery and when to go to the hospital.

## 2022-10-03 ENCOUNTER — ROUTINE PRENATAL (OUTPATIENT)
Dept: OBGYN | Age: 32
End: 2022-10-03
Payer: MEDICAID

## 2022-10-03 VITALS
WEIGHT: 264 LBS | SYSTOLIC BLOOD PRESSURE: 118 MMHG | HEART RATE: 100 BPM | DIASTOLIC BLOOD PRESSURE: 83 MMHG | BODY MASS INDEX: 42.61 KG/M2

## 2022-10-03 DIAGNOSIS — Z3A.38 38 WEEKS GESTATION OF PREGNANCY: Primary | ICD-10-CM

## 2022-10-03 LAB
GLUCOSE URINE, POC: NORMAL
PROTEIN UA: POSITIVE

## 2022-10-03 PROCEDURE — 99213 OFFICE O/P EST LOW 20 MIN: CPT | Performed by: OBSTETRICS & GYNECOLOGY

## 2022-10-03 PROCEDURE — 81002 URINALYSIS NONAUTO W/O SCOPE: CPT | Performed by: OBSTETRICS & GYNECOLOGY

## 2022-10-03 NOTE — PROGRESS NOTES
No SS preeclampsia. no BLD, LO F or CTN's. Mother will be her support person FOB is in [facility] and unable to come. We will check cervix next visit if favorable can discuss IOL.

## 2022-10-10 ENCOUNTER — ANCILLARY PROCEDURE (OUTPATIENT)
Dept: OBGYN CLINIC | Age: 32
End: 2022-10-10
Payer: MEDICAID

## 2022-10-10 ENCOUNTER — ROUTINE PRENATAL (OUTPATIENT)
Dept: OBGYN | Age: 32
End: 2022-10-10
Payer: MEDICAID

## 2022-10-10 ENCOUNTER — INITIAL PRENATAL (OUTPATIENT)
Dept: OBGYN CLINIC | Age: 32
End: 2022-10-10

## 2022-10-10 VITALS
HEART RATE: 89 BPM | SYSTOLIC BLOOD PRESSURE: 132 MMHG | BODY MASS INDEX: 42.93 KG/M2 | WEIGHT: 266 LBS | DIASTOLIC BLOOD PRESSURE: 86 MMHG

## 2022-10-10 DIAGNOSIS — Z34.03 PRIMIGRAVIDA IN THIRD TRIMESTER: ICD-10-CM

## 2022-10-10 DIAGNOSIS — Z3A.39 39 WEEKS GESTATION OF PREGNANCY: Primary | ICD-10-CM

## 2022-10-10 PROBLEM — O47.00 PRETERM CONTRACTIONS: Status: RESOLVED | Noted: 2022-08-31 | Resolved: 2022-10-10

## 2022-10-10 LAB
GLUCOSE URINE, POC: NEGATIVE
PROTEIN UA: NEGATIVE

## 2022-10-10 PROCEDURE — 76805 OB US >/= 14 WKS SNGL FETUS: CPT | Performed by: OBSTETRICS & GYNECOLOGY

## 2022-10-10 PROCEDURE — 99213 OFFICE O/P EST LOW 20 MIN: CPT | Performed by: OBSTETRICS & GYNECOLOGY

## 2022-10-10 PROCEDURE — 81002 URINALYSIS NONAUTO W/O SCOPE: CPT | Performed by: OBSTETRICS & GYNECOLOGY

## 2022-10-10 PROCEDURE — 99999 PR OFFICE/OUTPT VISIT,PROCEDURE ONLY: CPT | Performed by: OBSTETRICS & GYNECOLOGY

## 2022-10-10 NOTE — PROGRESS NOTES
[address] FETAL MEDICINE  [address] [facility] [address] [phone]   [facility] [address] FETAL MEDICINE  [address] [facility] [address]  Dept: [phone]  Loc: [phone]     10/10/2022    RE:  Chuck Martinez     : 1990   AGE: 28 y.o. Dear Dr. Airam Ridley,    Thank you for allowing me to see Chuck Martinez. As I'm sure you will recall, Chuck Martinez is a 28 y.o. [id number]'s last menstrual period was 2022.  Estimated Date of Delivery: 10/16/22 at 39w1d seen in our office today for the following:    REASON FOR VISIT: Growth     Patient Active Problem List    Diagnosis Date Noted    Primigravida in third trimester 10/10/2022    39 weeks gestation of pregnancy 10/10/2022    BMI 40.0-44.9, adult ([address] 10/10/2022    Chronic pain of both knees 2020        PAST HISTORY:  OB History    Para Term  AB Living   1         0   SAB IAB Ectopic Molar Multiple Live Births                    # Outcome Date GA Lbr Marcelo/2nd Weight Sex Delivery Anes PTL Lv   1 Current                   MEDICAL:  Past Medical History:   Diagnosis Date    Hypothyroidism         SURGICAL:  Past Surgical History:   Procedure Laterality Date    FOOT SURGERY      left foot and leg stepped on a needle       ALLERGIES:    No Known Allergies      MEDICATIONS:    Current Outpatient Medications   Medication Sig Dispense Refill    ferrous sulfate (IRON 325) 325 (65 Fe) MG tablet Take 1 tablet by mouth every other day 30 tablet 5    Prenatal Vit-Fe Fumarate-FA (PRENATAL PLUS) 27-1 MG TABS Take 1 tablet by mouth daily 90 tablet 3    aspirin 81 MG chewable tablet Take 81 mg by mouth daily (Patient not taking: Reported on 10/10/2022)      Prenatal Vit-Fe Fumarate-FA (PRENATAL VITAMIN PO) Take 1 tablet by mouth daily (Patient not taking: Reported on 2022) vitamin D (ERGOCALCIFEROL) 1.25 MG (74908 UT) CAPS capsule Take 1 capsule by mouth once a week (Patient not taking: Reported on 10/10/2022) 6 capsule 1    levothyroxine (SYNTHROID) 75 MCG tablet Take 1 tablet by mouth daily (Patient taking differently: Take 75 mcg by mouth daily Currently taking) 90 tablet 1     No current facility-administered medications for this visit. Social History     Socioeconomic History    Marital status:    Tobacco Use    Smoking status: Never    Smokeless tobacco: Never   Substance and Sexual Activity    Alcohol use: Never    Drug use: Never    Sexual activity: Yes     Partners: Male     Comment:           FAMILY MEDICAL HISTORY:   Family History   Problem Relation Age of Onset    Kidney Disease Paternal Grandfather     Cancer Paternal Grandmother     Cancer Maternal Grandmother     Hypertension Maternal Grandfather     Diabetes Maternal Grandfather     Diabetes Father     Hemophilia Father     Hemophilia Mother           PHYSICAL EXAMINATION:  LMP 01/09/2022   There is no height or weight on file to calculate BMI. Urine dipstick:  No results found for this visit on 10/10/22. A/an Growth  ultrasound was done in our office today. Please refer to the enclosed copy of the ultrasound report for further information. Discussion:  There is a young fetus in a cephalic presentation. Fetal cardiac motion and fetal motion was detected and appeared to be grossly normal.  There has been appropriate interval growth. The baby is AGA at the 87th percentile weighing 3696 g or 8 pounds 2 ounces. The placenta was posterior. The amniotic fluid volume is normal.    IMPRESSION:  1. Single intrauterine gestation at 39+ weeks with appropriate interval growth. 2. The fetal anatomy appears normal and the fetal lie is cephalic. 3. The amniotic fluid volume is normal and the placenta is anterior.     RECOMMENDATIONS:  Each of the recommendations were discussed with the patient:  1. Follow-up would be otherwise as clinically indicated    The patient is to continue to follow with you in your office for ongoing obstetric care. PLAN:    As noted above or sooner prn.     Sincerely,        Noble Rao MD

## 2022-10-10 NOTE — PROGRESS NOTES
Routine ob  +fm  Pt denies lof vag bleeding or contractions  Pt voiced stopped 81 mg aspirin noticed on bottle it was 100 mg not sure if should continue  Pt voiced not taking separate vit D was told vit D in prenatal and now not sure if what her dosage is.

## 2022-10-12 ENCOUNTER — ROUTINE PRENATAL (OUTPATIENT)
Dept: OBGYN | Age: 32
DRG: 560 | End: 2022-10-12
Payer: MEDICAID

## 2022-10-12 VITALS
DIASTOLIC BLOOD PRESSURE: 83 MMHG | HEART RATE: 100 BPM | BODY MASS INDEX: 42.61 KG/M2 | WEIGHT: 264 LBS | SYSTOLIC BLOOD PRESSURE: 134 MMHG

## 2022-10-12 DIAGNOSIS — Z3A.39 39 WEEKS GESTATION OF PREGNANCY: Primary | ICD-10-CM

## 2022-10-12 LAB
GLUCOSE URINE, POC: NEGATIVE
PROTEIN UA: NEGATIVE

## 2022-10-12 PROCEDURE — 81002 URINALYSIS NONAUTO W/O SCOPE: CPT | Performed by: ADVANCED PRACTICE MIDWIFE

## 2022-10-12 PROCEDURE — 99213 OFFICE O/P EST LOW 20 MIN: CPT | Performed by: ADVANCED PRACTICE MIDWIFE

## 2022-10-13 NOTE — PROGRESS NOTES
Efra Haro is a 28 y.o. female 43w3d        OB History    Para Term  AB Living   1         0   SAB IAB Ectopic Molar Multiple Live Births                    # Outcome Date GA Lbr Marcelo/2nd Weight Sex Delivery Anes PTL Lv   1 Current                  Mother's Prenatal Vitals  BP: 134/83  Weight: 264 lb (119.7 kg)  Heart Rate: 100  Patient Position: Sitting  Alb/Glu  Albumin: Negative  Glucose: Negative  Prenatal Fetal Information  Fundal Height (cm): 37 cm  Fetal HR: 136  Movement: Present  Presentation: Vertex  Dil/Eff/Sta  Dilation (cm): 1  Effacement: 50  Station: -2      The patient was seen and evaluated. There was positive fetal movements. No contractions or leakage of fluid. Signs and symptoms of labor were reviewed. The S/S of Pre-Eclampsia were reviewed with the patient in detail. She is to report any of these if they occur. She currently denies any of these. The patient reports that the targets have been made Yes. T-Dap Vaccine Completed (27-36 weeks): Yes, done 2022    Allergies: Allergies as of 10/12/2022    (No Known Allergies)         Group Beta Strep collection was completed. Yes, negative 2022    The patient was found to be GBS: negative    Cervical Exam was:   1 cm dilated/      The patient was counseled on Labor & Delivery. Route of delivery and counseling on vaginal, operative vaginal, and  sections were completed with the risks of each to both the patient as well as her baby. The patient was counseled on types of analgesia during labor and is considering either Regional or IV medication the risks, benefits and alternatives were discussed. Assessment:  1. Efra Haro is a 28 y.o. female  2.   3. 39w4d  4. Scheduled for induction on Friday due to Dr. Henderson Fearing advice due to LGA.     Patient Active Problem List    Diagnosis Date Noted    Primigravida in third trimester 10/10/2022     Priority: Medium    39 weeks gestation of pregnancy 10/10/2022     Priority: Medium    BMI 40.0-44.9, adult ([facility] [address] [address] 10/10/2022     Priority: Medium    Chronic pain of both knees 08/27/2020        Diagnosis Orders   1. 39 weeks gestation of pregnancy  POCT urine qual dipstick glucose    POCT urine qual dipstick protein              Plan:  The patient will return to the office for her next visit 2 weeks after delivery. See antepartum flow sheet.

## 2022-10-14 ENCOUNTER — HOSPITAL ENCOUNTER (INPATIENT)
Age: 32
LOS: 4 days | Discharge: HOME | DRG: 560 | End: 2022-10-18
Attending: OBSTETRICS & GYNECOLOGY | Admitting: OBSTETRICS & GYNECOLOGY
Payer: MEDICAID

## 2022-10-14 ENCOUNTER — APPOINTMENT (OUTPATIENT)
Dept: LABOR AND DELIVERY | Age: 32
DRG: 560 | End: 2022-10-14
Payer: MEDICAID

## 2022-10-14 LAB
ABO/RH: NORMAL
AMPHETAMINE SCREEN, URINE: NOT DETECTED
ANTIBODY SCREEN: NORMAL
BARBITURATE SCREEN URINE: NOT DETECTED
BASOPHILS ABSOLUTE: 0.03 E9/L (ref 0–0.2)
BASOPHILS RELATIVE PERCENT: 0.3 % (ref 0–2)
BENZODIAZEPINE SCREEN, URINE: NOT DETECTED
CANNABINOID SCREEN URINE: NOT DETECTED
COCAINE METABOLITE SCREEN URINE: NOT DETECTED
EOSINOPHILS ABSOLUTE: 0.05 E9/L (ref 0.05–0.5)
EOSINOPHILS RELATIVE PERCENT: 0.4 % (ref 0–6)
FENTANYL SCREEN, URINE: NOT DETECTED
HCT VFR BLD CALC: 36.2 % (ref 34–48)
HEMOGLOBIN: 11.9 G/DL (ref 11.5–15.5)
IMMATURE GRANULOCYTES #: 0.06 E9/L
IMMATURE GRANULOCYTES %: 0.5 % (ref 0–5)
LYMPHOCYTES ABSOLUTE: 2.46 E9/L (ref 1.5–4)
LYMPHOCYTES RELATIVE PERCENT: 20.7 % (ref 20–42)
Lab: NORMAL
MCH RBC QN AUTO: 29.2 PG (ref 26–35)
MCHC RBC AUTO-ENTMCNC: 32.9 % (ref 32–34.5)
MCV RBC AUTO: 88.7 FL (ref 80–99.9)
METHADONE SCREEN, URINE: NOT DETECTED
MONOCYTES ABSOLUTE: 0.62 E9/L (ref 0.1–0.95)
MONOCYTES RELATIVE PERCENT: 5.2 % (ref 2–12)
NEUTROPHILS ABSOLUTE: 8.69 E9/L (ref 1.8–7.3)
NEUTROPHILS RELATIVE PERCENT: 72.9 % (ref 43–80)
OPIATE SCREEN URINE: NOT DETECTED
OXYCODONE URINE: NOT DETECTED
PDW BLD-RTO: 12.6 FL (ref 11.5–15)
PHENCYCLIDINE SCREEN URINE: NOT DETECTED
PLATELET # BLD: 231 E9/L (ref 130–450)
PMV BLD AUTO: 10.4 FL (ref 7–12)
RBC # BLD: 4.08 E12/L (ref 3.5–5.5)
WBC # BLD: 11.9 E9/L (ref 4.5–11.5)

## 2022-10-14 PROCEDURE — 86900 BLOOD TYPING SEROLOGIC ABO: CPT

## 2022-10-14 PROCEDURE — 6370000000 HC RX 637 (ALT 250 FOR IP): Performed by: OBSTETRICS & GYNECOLOGY

## 2022-10-14 PROCEDURE — 36415 COLL VENOUS BLD VENIPUNCTURE: CPT

## 2022-10-14 PROCEDURE — 87591 N.GONORRHOEAE DNA AMP PROB: CPT

## 2022-10-14 PROCEDURE — 80307 DRUG TEST PRSMV CHEM ANLYZR: CPT

## 2022-10-14 PROCEDURE — 86850 RBC ANTIBODY SCREEN: CPT

## 2022-10-14 PROCEDURE — 85025 COMPLETE CBC W/AUTO DIFF WBC: CPT

## 2022-10-14 PROCEDURE — 99221 1ST HOSP IP/OBS SF/LOW 40: CPT | Performed by: OBSTETRICS & GYNECOLOGY

## 2022-10-14 PROCEDURE — 1220000001 HC SEMI PRIVATE L&D R&B

## 2022-10-14 PROCEDURE — 87491 CHLMYD TRACH DNA AMP PROBE: CPT

## 2022-10-14 PROCEDURE — 86901 BLOOD TYPING SEROLOGIC RH(D): CPT

## 2022-10-14 RX ORDER — MISOPROSTOL 200 UG/1
800 TABLET ORAL PRN
Status: DISCONTINUED | OUTPATIENT
Start: 2022-10-14 | End: 2022-10-17

## 2022-10-14 RX ORDER — ACETAMINOPHEN 325 MG/1
650 TABLET ORAL EVERY 4 HOURS PRN
Status: DISCONTINUED | OUTPATIENT
Start: 2022-10-14 | End: 2022-10-17

## 2022-10-14 RX ORDER — LEVOTHYROXINE SODIUM 0.07 MG/1
75 TABLET ORAL DAILY
Status: DISCONTINUED | OUTPATIENT
Start: 2022-10-15 | End: 2022-10-17

## 2022-10-14 RX ORDER — SODIUM CHLORIDE, SODIUM LACTATE, POTASSIUM CHLORIDE, CALCIUM CHLORIDE 600; 310; 30; 20 MG/100ML; MG/100ML; MG/100ML; MG/100ML
INJECTION, SOLUTION INTRAVENOUS CONTINUOUS
Status: DISCONTINUED | OUTPATIENT
Start: 2022-10-14 | End: 2022-10-17

## 2022-10-14 RX ORDER — SODIUM CHLORIDE, SODIUM LACTATE, POTASSIUM CHLORIDE, AND CALCIUM CHLORIDE .6; .31; .03; .02 G/100ML; G/100ML; G/100ML; G/100ML
1000 INJECTION, SOLUTION INTRAVENOUS PRN
Status: DISCONTINUED | OUTPATIENT
Start: 2022-10-14 | End: 2022-10-17

## 2022-10-14 RX ORDER — METHYLERGONOVINE MALEATE 0.2 MG/ML
200 INJECTION INTRAVENOUS PRN
Status: DISCONTINUED | OUTPATIENT
Start: 2022-10-14 | End: 2022-10-17

## 2022-10-14 RX ORDER — ONDANSETRON 2 MG/ML
4 INJECTION INTRAMUSCULAR; INTRAVENOUS EVERY 6 HOURS PRN
Status: DISCONTINUED | OUTPATIENT
Start: 2022-10-14 | End: 2022-10-17

## 2022-10-14 RX ORDER — CARBOPROST TROMETHAMINE 250 UG/ML
250 INJECTION, SOLUTION INTRAMUSCULAR PRN
Status: DISCONTINUED | OUTPATIENT
Start: 2022-10-14 | End: 2022-10-17

## 2022-10-14 RX ORDER — SODIUM CHLORIDE, SODIUM LACTATE, POTASSIUM CHLORIDE, AND CALCIUM CHLORIDE .6; .31; .03; .02 G/100ML; G/100ML; G/100ML; G/100ML
500 INJECTION, SOLUTION INTRAVENOUS PRN
Status: DISCONTINUED | OUTPATIENT
Start: 2022-10-14 | End: 2022-10-17

## 2022-10-14 RX ORDER — SODIUM CHLORIDE 0.9 % (FLUSH) 0.9 %
5-40 SYRINGE (ML) INJECTION PRN
Status: DISCONTINUED | OUTPATIENT
Start: 2022-10-14 | End: 2022-10-17

## 2022-10-14 RX ORDER — ASPIRIN 81 MG/1
81 TABLET, CHEWABLE ORAL DAILY
Status: DISCONTINUED | OUTPATIENT
Start: 2022-10-15 | End: 2022-10-17

## 2022-10-14 RX ORDER — DOCUSATE SODIUM 100 MG/1
100 CAPSULE, LIQUID FILLED ORAL 2 TIMES DAILY
Status: DISCONTINUED | OUTPATIENT
Start: 2022-10-14 | End: 2022-10-17

## 2022-10-14 RX ORDER — SODIUM CHLORIDE 9 MG/ML
25 INJECTION, SOLUTION INTRAVENOUS PRN
Status: DISCONTINUED | OUTPATIENT
Start: 2022-10-14 | End: 2022-10-17

## 2022-10-14 RX ORDER — SODIUM CHLORIDE 0.9 % (FLUSH) 0.9 %
5-40 SYRINGE (ML) INJECTION EVERY 12 HOURS SCHEDULED
Status: DISCONTINUED | OUTPATIENT
Start: 2022-10-14 | End: 2022-10-17

## 2022-10-14 RX ADMIN — Medication 25 MCG: at 22:29

## 2022-10-15 ENCOUNTER — ANESTHESIA (OUTPATIENT)
Dept: LABOR AND DELIVERY | Age: 32
DRG: 560 | End: 2022-10-15
Payer: MEDICAID

## 2022-10-15 ENCOUNTER — ANESTHESIA EVENT (OUTPATIENT)
Dept: LABOR AND DELIVERY | Age: 32
DRG: 560 | End: 2022-10-15
Payer: MEDICAID

## 2022-10-15 PROCEDURE — 2580000003 HC RX 258: Performed by: OBSTETRICS & GYNECOLOGY

## 2022-10-15 PROCEDURE — 51701 INSERT BLADDER CATHETER: CPT

## 2022-10-15 PROCEDURE — 2500000003 HC RX 250 WO HCPCS: Performed by: ANESTHESIOLOGY

## 2022-10-15 PROCEDURE — 6370000000 HC RX 637 (ALT 250 FOR IP): Performed by: OBSTETRICS & GYNECOLOGY

## 2022-10-15 PROCEDURE — 99231 SBSQ HOSP IP/OBS SF/LOW 25: CPT | Performed by: OBSTETRICS & GYNECOLOGY

## 2022-10-15 PROCEDURE — 6360000002 HC RX W HCPCS: Performed by: STUDENT IN AN ORGANIZED HEALTH CARE EDUCATION/TRAINING PROGRAM

## 2022-10-15 PROCEDURE — 3700000025 EPIDURAL BLOCK: Performed by: ANESTHESIOLOGY

## 2022-10-15 PROCEDURE — 1220000001 HC SEMI PRIVATE L&D R&B

## 2022-10-15 RX ORDER — EPHEDRINE SULFATE/0.9% NACL/PF 50 MG/5 ML
SYRINGE (ML) INTRAVENOUS
Status: DISPENSED
Start: 2022-10-15 | End: 2022-10-16

## 2022-10-15 RX ORDER — EPHEDRINE SULFATE 50 MG/ML
5 INJECTION, SOLUTION INTRAVENOUS PRN
Status: DISCONTINUED | OUTPATIENT
Start: 2022-10-15 | End: 2022-10-17

## 2022-10-15 RX ORDER — NALOXONE HYDROCHLORIDE 0.4 MG/ML
INJECTION, SOLUTION INTRAMUSCULAR; INTRAVENOUS; SUBCUTANEOUS PRN
Status: DISCONTINUED | OUTPATIENT
Start: 2022-10-15 | End: 2022-10-17

## 2022-10-15 RX ORDER — OXYTOCIN/0.9 % SODIUM CHLORIDE 30/500 ML
1-20 PLASTIC BAG, INJECTION (ML) INTRAVENOUS CONTINUOUS
Status: DISCONTINUED | OUTPATIENT
Start: 2022-10-15 | End: 2022-10-17

## 2022-10-15 RX ORDER — ACETAMINOPHEN 650 MG
TABLET, EXTENDED RELEASE ORAL
Status: DISPENSED
Start: 2022-10-15 | End: 2022-10-15

## 2022-10-15 RX ADMIN — Medication 1 MILLI-UNITS/MIN: at 16:00

## 2022-10-15 RX ADMIN — LEVOTHYROXINE SODIUM 75 MCG: 75 TABLET ORAL at 07:38

## 2022-10-15 RX ADMIN — Medication 25 MCG: at 07:32

## 2022-10-15 RX ADMIN — EPHEDRINE SULFATE 5 MG: 50 INJECTION INTRAMUSCULAR; INTRAVENOUS; SUBCUTANEOUS at 14:21

## 2022-10-15 RX ADMIN — SODIUM CHLORIDE, POTASSIUM CHLORIDE, SODIUM LACTATE AND CALCIUM CHLORIDE 1000 ML: 600; 310; 30; 20 INJECTION, SOLUTION INTRAVENOUS at 14:32

## 2022-10-15 RX ADMIN — SODIUM CHLORIDE, POTASSIUM CHLORIDE, SODIUM LACTATE AND CALCIUM CHLORIDE: 600; 310; 30; 20 INJECTION, SOLUTION INTRAVENOUS at 15:39

## 2022-10-15 RX ADMIN — SODIUM CHLORIDE, POTASSIUM CHLORIDE, SODIUM LACTATE AND CALCIUM CHLORIDE 1000 ML: 600; 310; 30; 20 INJECTION, SOLUTION INTRAVENOUS at 13:26

## 2022-10-15 RX ADMIN — Medication 25 MCG: at 02:33

## 2022-10-15 RX ADMIN — EPHEDRINE SULFATE 5 MG: 50 INJECTION INTRAMUSCULAR; INTRAVENOUS; SUBCUTANEOUS at 14:26

## 2022-10-15 RX ADMIN — Medication 15 ML/HR: at 14:10

## 2022-10-15 ASSESSMENT — PAIN DESCRIPTION - DESCRIPTORS: DESCRIPTORS: CRAMPING;DISCOMFORT

## 2022-10-15 NOTE — PROGRESS NOTES
Dr. Bailey Alston notified of decels and contractions and SVE, states to start pitocin if contractions space out.

## 2022-10-15 NOTE — PROGRESS NOTES
Pt here for scheduled induction. Pt feels good fetal movement, no LOF, and no bleeding. Pt attached to fetal monitor.

## 2022-10-15 NOTE — PROGRESS NOTES
Post 2 cytotec  menstrual cramps  Sent for induction by [facility] for lga EFW 8#6oz  Cervix 1 /50/-1 post  Wanted to try woo bulb but cervix very post and Wejdan uncomfortable with exam

## 2022-10-15 NOTE — ANESTHESIA PROCEDURE NOTES
Epidural Block    Patient location during procedure: OB  Start time: 10/15/2022 2:00 PM  End time: 10/15/2022 2:15 PM  Reason for block: labor epidural  Staffing  Performed: resident/CRNA   Anesthesiologist: Dino Kerr MD  Resident/CRNA: SUNNY Hanna CRNA  Epidural  Patient position: sitting  Prep: ChloraPrep  Patient monitoring: cardiac monitor, continuous pulse ox and frequent blood pressure checks  Approach: midline  Location: L3-4  Injection technique: NAVID air  Provider prep: mask and sterile gloves  Needle  Needle type: Tuohy   Needle gauge: 18 G  Needle length: 3.5 in  Needle insertion depth: 7 cm  Catheter type: end hole  Catheter size: 20 G.   Catheter at skin depth: 13 cm  Test dose: negativeCatheter Secured: tegaderm and tape  Assessment  Hemodynamics: stable  Attempts: 1  Outcomes: uncomplicated and patient tolerated procedure well  Preanesthetic Checklist  Completed: patient identified, IV checked, site marked, risks and benefits discussed, surgical/procedural consents, equipment checked, pre-op evaluation, timeout performed, anesthesia consent given, oxygen available and monitors applied/VS acknowledged

## 2022-10-15 NOTE — ANESTHESIA PRE PROCEDURE
Department of Anesthesiology  Preprocedure Note       Name:  Damaris Tubbs   Age:  28 y.o.  :  1990                                          MRN:  [id number]         Date:  10/15/2022      Surgeon: * No surgeons listed *    Procedure: * No procedures listed *    Medications prior to admission:   Prior to Admission medications    Medication Sig Start Date End Date Taking?  Authorizing Provider   ferrous sulfate (IRON 325) 325 (65 Fe) MG tablet Take 1 tablet by mouth every other day 22   Blondell Best, DO   Prenatal Vit-Fe Fumarate-FA (PRENATAL PLUS) 27-1 MG TABS Take 1 tablet by mouth daily 22  Blondell Best, DO   aspirin 81 MG chewable tablet Take 81 mg by mouth daily    Historical Provider, MD   levothyroxine (SYNTHROID) 75 MCG tablet Take 1 tablet by mouth daily  Patient taking differently: Take 75 mcg by mouth daily Currently taking 4/30/21 10/12/22  Osmin Campos MD       Current medications:    Current Facility-Administered Medications   Medication Dose Route Frequency Provider Last Rate Last Admin    povidone-iodine (BETADINE) 10 % external solution             lactated ringers infusion   IntraVENous Continuous Blondell Best, DO        lactated ringers bolus  500 mL IntraVENous PRN Blondell Best, DO        Or    lactated ringers bolus  1,000 mL IntraVENous PRN Blondell Best, DO        sodium chloride flush 0.9 % injection 5-40 mL  5-40 mL IntraVENous 2 times per day Blondell Best, DO        sodium chloride flush 0.9 % injection 5-40 mL  5-40 mL IntraVENous PRN Blondell Best, DO        0.9 % sodium chloride infusion  25 mL IntraVENous PRN Blondell Best, DO        ondansetron [facility]) injection 4 mg  4 mg IntraVENous Q6H PRN Blondell Best, DO        methylergonovine (METHERGINE) injection 200 mcg  200 mcg IntraMUSCular PRN Blondell Best, DO        carboprost (HEMABATE) injection 250 mcg  250 mcg IntraMUSCular PRN Blondell Best, DO  miSOPROStol (CYTOTEC) tablet 800 mcg  800 mcg Rectal PRN Arjun [place], DO        tranexamic acid (CYCLOKAPRON) 1000 mg in sodium chloride 0.9 % 50 mL IVPB  1,000 mg IntraVENous Once PRN [place], DO        acetaminophen (TYLENOL) tablet 650 mg  650 mg Oral Q4H PRN [place], DO        benzocaine-menthol (DERMOPLAST) 20-0.5 % spray   Topical PRN Arjun [place], DO        docusate sodium (COLACE) capsule 100 mg  100 mg Oral BID [place], DO        butorphanol (STADOL) injection 1 mg  1 mg IntraVENous Q3H PRN [place], DO        miSOPROStol (CYTOTEC) pre-split tablet TABS 25 mcg  25 mcg Vaginal Q4H Arjun [place], DO   25 mcg at 10/15/22 0732    levothyroxine (SYNTHROID) tablet 75 mcg  75 mcg Oral Daily Arjun [place], DO   75 mcg at 10/15/22 9046    aspirin chewable tablet 81 mg  81 mg Oral Daily Teresa Nielson DO           Allergies:  No Known Allergies    Problem List:    Patient Active Problem List   Diagnosis Code    Chronic pain of both knees M25.561, M25.562, G89.29    Primigravida in third trimester Z34.03    39 weeks gestation of pregnancy Z3A.39    BMI 40.0-44.9, adult ([facility] Z68.41       Past Medical History:        Diagnosis Date    Hypothyroidism        Past Surgical History:        Procedure Laterality Date    FOOT SURGERY      left foot and leg stepped on a needle       Social History:    Social History     Tobacco Use    Smoking status: Never    Smokeless tobacco: Never   Substance Use Topics    Alcohol use: Never                                Counseling given: Not Answered      Vital Signs (Current):   Vitals:    10/14/22 2234 10/15/22 0740 10/15/22 1130   BP: 125/76 (!) 113/55 135/83   Pulse: 68 56 70   Resp: 16 16 14   Temp: 36.8 °C (98.2 °F) 36.6 °C (97.8 °F) 36.3 °C (97.4 °F)   TempSrc: Oral Oral Oral                                              BP Readings from Last 3 Encounters:   10/15/22 135/83   10/12/22 134/83 10/10/22 132/86       NPO Status:                                                                                 BMI:   Wt Readings from Last 3 Encounters:   10/12/22 264 lb (119.7 kg)   10/10/22 266 lb (120.7 kg)   10/03/22 264 lb (119.7 kg)     There is no height or weight on file to calculate BMI.    CBC:   Lab Results   Component Value Date/Time    WBC 11.9 10/14/2022 10:40 PM    RBC 4.08 10/14/2022 10:40 PM    HGB 11.9 10/14/2022 10:40 PM    HCT 36.2 10/14/2022 10:40 PM    MCV 88.7 10/14/2022 10:40 PM    RDW 12.6 10/14/2022 10:40 PM     10/14/2022 10:40 PM       CMP:   Lab Results   Component Value Date/Time     04/09/2021 09:20 AM    K 4.2 04/09/2021 09:20 AM     04/09/2021 09:20 AM    CO2 25 04/09/2021 09:20 AM    BUN 8 04/09/2021 09:20 AM    CREATININE 0.7 04/09/2021 09:20 AM    GFRAA >60 04/09/2021 09:20 AM    LABGLOM >60 04/09/2021 09:20 AM    GLUCOSE 97 04/09/2021 09:20 AM    CALCIUM 9.1 04/09/2021 09:20 AM       POC Tests: No results for input(s): POCGLU, POCNA, POCK, POCCL, POCBUN, POCHEMO, POCHCT in the last 72 hours.     Coags: No results found for: PROTIME, INR, APTT    HCG (If Applicable): No results found for: PREGTESTUR, PREGSERUM, HCG, HCGQUANT     ABGs: No results found for: PHART, PO2ART, BDF8VES, WEJ0PDY, BEART, P9PZNFIG     Type & Screen (If Applicable):  No results found for: LABABO, LABRH    Drug/Infectious Status (If Applicable):  No results found for: HIV, HEPCAB    COVID-19 Screening (If Applicable): No results found for: COVID19        Anesthesia Evaluation  Patient summary reviewed and Nursing notes reviewed no history of anesthetic complications:   Airway: Mallampati: III  TM distance: >3 FB   Neck ROM: full  Mouth opening: > = 3 FB   Dental:          Pulmonary:Negative Pulmonary ROS breath sounds clear to auscultation                             Cardiovascular:Negative CV ROS            Rhythm: regular  Rate: normal                    Neuro/Psych:   Negative Neuro/Psych ROS              GI/Hepatic/Renal: Neg GI/Hepatic/Renal ROS            Endo/Other:    (+) hypothyroidism, blood dyscrasia: anemia:., .                 Abdominal:             Vascular: negative vascular ROS. Other Findings:           Anesthesia Plan      general, spinal and epidural     ASA 2             Anesthetic plan and risks discussed with patient. Use of blood products discussed with patient whom consented to blood products.                      Melany Rao, SUNNY - SADIA   10/15/2022

## 2022-10-15 NOTE — H&P
Obstetric History and Physical       CHIEF COMPLAINT:  IOL    HISTORY OF PRESENT ILLNESS:      Ancelmo Major is a 28 y.o., , female who presents at 38w11d with an [facility] of Estimated Date of Delivery: 10/16/22. OB History          1    Para        Term                AB        Living   0         SAB        IAB        Ectopic        Molar        Multiple        Live Births                Patient presents with a chief complaint of late prenatal care. No complications       PRENATAL CARE:    Labs:    Blood type: A+  Rubella  :   Lab Results   Component Value Date/Time    RUBELABIGG SEE BELOW 06/10/2022 10:14 AM      Rpr:  NR  GBS:neg  HBSAg: neg      Complicated by: none    PAST OB HISTORY  OB History          1    Para        Term                AB        Living   0         SAB        IAB        Ectopic        Molar        Multiple        Live Births                    Past Medical History:        Diagnosis Date    Hypothyroidism      Past Surgical History:        Procedure Laterality Date    FOOT SURGERY      left foot and leg stepped on a needle     Allergies:  Patient has no known allergies.   Social History:    No TAD    Family History:       Problem Relation Age of Onset    Kidney Disease Paternal Grandfather     Cancer Paternal Grandmother     Cancer Maternal Grandmother     Hypertension Maternal Grandfather     Diabetes Maternal Grandfather     Diabetes Father     Hemophilia Father     Hemophilia Mother      Medications Prior to Admission:  Medications Prior to Admission: ferrous sulfate (IRON 325) 325 (65 Fe) MG tablet, Take 1 tablet by mouth every other day  Prenatal Vit-Fe Fumarate-FA (PRENATAL PLUS) 27-1 MG TABS, Take 1 tablet by mouth daily  aspirin 81 MG chewable tablet, Take 81 mg by mouth daily  Prenatal Vit-Fe Fumarate-FA (PRENATAL VITAMIN PO), Take 1 tablet by mouth daily (Patient not taking: No sig reported)  vitamin D (ERGOCALCIFEROL) 1.25 MG (10663 UT) CAPS capsule, Take 1 capsule by mouth once a week (Patient not taking: No sig reported)  levothyroxine (SYNTHROID) 75 MCG tablet, Take 1 tablet by mouth daily (Patient taking differently: Take 75 mcg by mouth daily Currently taking)    REVIEW OF SYSTEMS:    CONSTITUTIONAL:  negative  RESPIRATORY:  negative  CARDIOVASCULAR:  negative  GASTROINTESTINAL:  negative  ALLERGIC/IMMUNOLOGIC:  negative  NEUROLOGICAL:  negative  BEHAVIOR/PSYCH:  negative    PHYSICAL EXAM:  There were no vitals filed for this visit. General appearance:  awake, alert, cooperative, no apparent distress, and appears stated age  Neurologic:  Awake, alert, oriented to name, place and time. Lungs:  No increased work of breathing, good air exchange  Abdomen:  Soft, non tender, gravid, consistent with her gestational age, EFW by Leopald's manouever was 8#5   Fetal heart rate:  Reassuring.   Pelvis:  Adequate pelvis  Cervix: Closed 25% medium -1  Contraction frequency:  0 minutes    Membranes:  Intact    ASSESSMENT AND PLAN:    Labor: Admit, anticipate normal delivery,  cytotec induction  Fetus: Reassuring cat 1   GBS: No

## 2022-10-15 NOTE — PROGRESS NOTES
3 minute decel noted  There is moderate variability   Occurred right after epidural with hypotension  Moved to side, given O2  Ephedrine given  Baby recovered  Will continue to monitor for now

## 2022-10-16 PROCEDURE — 2500000003 HC RX 250 WO HCPCS

## 2022-10-16 PROCEDURE — 6360000002 HC RX W HCPCS: Performed by: OBSTETRICS & GYNECOLOGY

## 2022-10-16 PROCEDURE — 2500000003 HC RX 250 WO HCPCS: Performed by: ANESTHESIOLOGY

## 2022-10-16 PROCEDURE — 2580000003 HC RX 258: Performed by: OBSTETRICS & GYNECOLOGY

## 2022-10-16 PROCEDURE — 1220000001 HC SEMI PRIVATE L&D R&B

## 2022-10-16 RX ORDER — LIDOCAINE HYDROCHLORIDE 10 MG/ML
INJECTION, SOLUTION INFILTRATION; PERINEURAL
Status: COMPLETED
Start: 2022-10-16 | End: 2022-10-16

## 2022-10-16 RX ORDER — BUPIVACAINE HYDROCHLORIDE 2.5 MG/ML
INJECTION, SOLUTION EPIDURAL; INFILTRATION; INTRACAUDAL PRN
Status: DISCONTINUED | OUTPATIENT
Start: 2022-10-16 | End: 2022-10-17 | Stop reason: SDUPTHER

## 2022-10-16 RX ORDER — ACETAMINOPHEN 650 MG
TABLET, EXTENDED RELEASE ORAL
Status: COMPLETED
Start: 2022-10-16 | End: 2022-10-16

## 2022-10-16 RX ADMIN — Medication 15 ML/HR: at 07:28

## 2022-10-16 RX ADMIN — Medication 166.7 ML: at 22:25

## 2022-10-16 RX ADMIN — Medication: at 22:15

## 2022-10-16 RX ADMIN — Medication 12 ML/HR: at 17:46

## 2022-10-16 RX ADMIN — ONDANSETRON 4 MG: 2 INJECTION INTRAMUSCULAR; INTRAVENOUS at 10:25

## 2022-10-16 RX ADMIN — SODIUM CHLORIDE, POTASSIUM CHLORIDE, SODIUM LACTATE AND CALCIUM CHLORIDE: 600; 310; 30; 20 INJECTION, SOLUTION INTRAVENOUS at 01:23

## 2022-10-16 RX ADMIN — BUPIVACAINE HYDROCHLORIDE 10 ML: 2.5 INJECTION, SOLUTION EPIDURAL; INFILTRATION; INTRACAUDAL; PERINEURAL at 20:28

## 2022-10-16 RX ADMIN — LIDOCAINE HYDROCHLORIDE 100 MG: 10 INJECTION, SOLUTION INFILTRATION; PERINEURAL at 22:28

## 2022-10-16 ASSESSMENT — PAIN DESCRIPTION - DESCRIPTORS: DESCRIPTORS: SHARP

## 2022-10-16 ASSESSMENT — PAIN DESCRIPTION - LOCATION: LOCATION: PERINEUM

## 2022-10-16 ASSESSMENT — PAIN SCALES - GENERAL: PAINLEVEL_OUTOF10: 10

## 2022-10-16 NOTE — PROGRESS NOTES
Dr. Lizzy Polanco updated on patient orders from previous house officer, he would like to come out and evaluate the patient and decide what orders to place on her next.

## 2022-10-16 NOTE — PROGRESS NOTES
Called by RN to assess patient's cervix  Patient moaning with contractions, feeling a lot of pressure. She asked for epidural to be turned off because she was dizzy    FHT's 140 with mod variability and accelerations, no decels, occasional variable  Contractions q 2-3 minutes, adequate  Cervix 4/100/0 station with some caput    Impression:  Category II tracing at present for variable, overall reassuring  Good progress from last cervical exam    Plan:  Discussed restarting epidural infusion so that she can get some pain relief.   RN to call anesthesia

## 2022-10-16 NOTE — PROGRESS NOTES
Patient comfortable with epidural, complaining of nausea intermittently, has been medicated with Zofran  FHT's 120 with mod variability, + accels occasional decels occasional variables, some dropout  Contractions  difficult to trace with external monitor   Cervix 3/60/-2.   Left side of cervix about 80% effaced     Leaking thin meconium stained fluid    IUPC and FSE placed without difficulty    Patient tolerated well    Impression:  Category 1 tracing at present    Dr. Lux Granados updated

## 2022-10-16 NOTE — PROGRESS NOTES
Pt discharged home with parent/guardian. Pt acting age appropriately, respirations regular and unlabored, cap refill less than two seconds. Skin pink, dry and warm. Lungs clear bilaterally. No further complaints at this time. Parent/guardian verbalized understanding of discharge paperwork and has no further questions at this time. Education provided about continuation of care, follow up care and medication administration, antibiotic instructions, motrin for discomfort or fever. Parent/guardian able to provided teach back about discharge instructions. Spoke with Patty Primrose, patient requesting epidural bolus. States he will come see patient.

## 2022-10-16 NOTE — PROGRESS NOTES
Care of patient taken over. Call light within reach. Report from [facility]. Patient in knee chest feeling pressure with each contraction.

## 2022-10-16 NOTE — PROGRESS NOTES
Dr. Brian Kay updated on patient SVE, late decelerations, and pitocin being turned off. Order given to leave pitocin off and keep working on the cook catheter as she labors.

## 2022-10-17 PROBLEM — M25.562 CHRONIC PAIN OF BOTH KNEES: Status: RESOLVED | Noted: 2020-08-27 | Resolved: 2022-10-17

## 2022-10-17 PROBLEM — E03.9 HYPOTHYROIDISM DURING PREGNANCY IN THIRD TRIMESTER: Status: ACTIVE | Noted: 2022-10-17

## 2022-10-17 PROBLEM — G89.29 CHRONIC PAIN OF BOTH KNEES: Status: RESOLVED | Noted: 2020-08-27 | Resolved: 2022-10-17

## 2022-10-17 PROBLEM — E03.9 HYPOTHYROIDISM: Status: ACTIVE | Noted: 2019-09-20

## 2022-10-17 PROBLEM — M25.561 CHRONIC PAIN OF BOTH KNEES: Status: RESOLVED | Noted: 2020-08-27 | Resolved: 2022-10-17

## 2022-10-17 PROBLEM — Z3A.40 40 WEEKS GESTATION OF PREGNANCY: Status: ACTIVE | Noted: 2022-10-10

## 2022-10-17 PROBLEM — O99.283 HYPOTHYROIDISM DURING PREGNANCY IN THIRD TRIMESTER: Status: ACTIVE | Noted: 2022-10-17

## 2022-10-17 PROBLEM — O09.33 LATE PRENATAL CARE AFFECTING PREGNANCY IN THIRD TRIMESTER: Status: ACTIVE | Noted: 2022-10-17

## 2022-10-17 PROBLEM — O99.213 OBESITY AFFECTING PREGNANCY IN THIRD TRIMESTER: Status: ACTIVE | Noted: 2022-10-17

## 2022-10-17 PROCEDURE — 0UQMXZZ REPAIR VULVA, EXTERNAL APPROACH: ICD-10-PCS | Performed by: OBSTETRICS & GYNECOLOGY

## 2022-10-17 PROCEDURE — 99024 POSTOP FOLLOW-UP VISIT: CPT | Performed by: OBSTETRICS & GYNECOLOGY

## 2022-10-17 PROCEDURE — 0KQM0ZZ REPAIR PERINEUM MUSCLE, OPEN APPROACH: ICD-10-PCS | Performed by: OBSTETRICS & GYNECOLOGY

## 2022-10-17 PROCEDURE — 3E0P7VZ INTRODUCTION OF HORMONE INTO FEMALE REPRODUCTIVE, VIA NATURAL OR ARTIFICIAL OPENING: ICD-10-PCS | Performed by: OBSTETRICS & GYNECOLOGY

## 2022-10-17 PROCEDURE — 7200000001 HC VAGINAL DELIVERY

## 2022-10-17 PROCEDURE — 1220000000 HC SEMI PRIVATE OB R&B

## 2022-10-17 PROCEDURE — 6370000000 HC RX 637 (ALT 250 FOR IP): Performed by: OBSTETRICS & GYNECOLOGY

## 2022-10-17 PROCEDURE — 10907ZC DRAINAGE OF AMNIOTIC FLUID, THERAPEUTIC FROM PRODUCTS OF CONCEPTION, VIA NATURAL OR ARTIFICIAL OPENING: ICD-10-PCS | Performed by: OBSTETRICS & GYNECOLOGY

## 2022-10-17 RX ORDER — DOCUSATE SODIUM 100 MG/1
100 CAPSULE, LIQUID FILLED ORAL 2 TIMES DAILY
Status: DISCONTINUED | OUTPATIENT
Start: 2022-10-17 | End: 2022-10-18 | Stop reason: HOSPADM

## 2022-10-17 RX ORDER — OXYCODONE HYDROCHLORIDE 5 MG/1
5 TABLET ORAL EVERY 4 HOURS PRN
Status: DISCONTINUED | OUTPATIENT
Start: 2022-10-17 | End: 2022-10-18 | Stop reason: HOSPADM

## 2022-10-17 RX ORDER — PRENATAL WITH FERROUS FUM AND FOLIC ACID 3080; 920; 120; 400; 22; 1.84; 3; 20; 10; 1; 12; 200; 27; 25; 2 [IU]/1; [IU]/1; MG/1; [IU]/1; MG/1; MG/1; MG/1; MG/1; MG/1; MG/1; UG/1; MG/1; MG/1; MG/1; MG/1
1 TABLET ORAL
Status: DISCONTINUED | OUTPATIENT
Start: 2022-10-17 | End: 2022-10-18 | Stop reason: HOSPADM

## 2022-10-17 RX ORDER — MODIFIED LANOLIN
OINTMENT (GRAM) TOPICAL PRN
Status: DISCONTINUED | OUTPATIENT
Start: 2022-10-17 | End: 2022-10-18 | Stop reason: HOSPADM

## 2022-10-17 RX ORDER — ACETAMINOPHEN 500 MG
1000 TABLET ORAL EVERY 8 HOURS PRN
Status: DISCONTINUED | OUTPATIENT
Start: 2022-10-17 | End: 2022-10-18 | Stop reason: HOSPADM

## 2022-10-17 RX ORDER — LEVOTHYROXINE SODIUM 0.07 MG/1
75 TABLET ORAL DAILY
Status: DISCONTINUED | OUTPATIENT
Start: 2022-10-17 | End: 2022-10-18 | Stop reason: HOSPADM

## 2022-10-17 RX ORDER — IBUPROFEN 600 MG/1
600 TABLET ORAL EVERY 6 HOURS PRN
Status: DISCONTINUED | OUTPATIENT
Start: 2022-10-17 | End: 2022-10-18 | Stop reason: HOSPADM

## 2022-10-17 RX ADMIN — Medication: at 08:54

## 2022-10-17 RX ADMIN — IBUPROFEN 600 MG: 600 TABLET ORAL at 08:53

## 2022-10-17 RX ADMIN — DOCUSATE SODIUM 100 MG: 100 CAPSULE, LIQUID FILLED ORAL at 00:25

## 2022-10-17 RX ADMIN — ACETAMINOPHEN 1000 MG: 500 TABLET ORAL at 21:55

## 2022-10-17 RX ADMIN — METFORMIN HYDROCHLORIDE 1 TABLET: 500 TABLET, EXTENDED RELEASE ORAL at 08:53

## 2022-10-17 RX ADMIN — ACETAMINOPHEN 1000 MG: 500 TABLET ORAL at 03:00

## 2022-10-17 RX ADMIN — DOCUSATE SODIUM 100 MG: 100 CAPSULE, LIQUID FILLED ORAL at 21:55

## 2022-10-17 RX ADMIN — DOCUSATE SODIUM 100 MG: 100 CAPSULE, LIQUID FILLED ORAL at 08:53

## 2022-10-17 RX ADMIN — IBUPROFEN 600 MG: 600 TABLET ORAL at 00:25

## 2022-10-17 ASSESSMENT — PAIN DESCRIPTION - DESCRIPTORS
DESCRIPTORS: CRAMPING
DESCRIPTORS: SORE

## 2022-10-17 ASSESSMENT — PAIN DESCRIPTION - LOCATION
LOCATION: PERINEUM
LOCATION: ABDOMEN

## 2022-10-17 ASSESSMENT — PAIN SCALES - GENERAL
PAINLEVEL_OUTOF10: 4
PAINLEVEL_OUTOF10: 3

## 2022-10-17 ASSESSMENT — PAIN - FUNCTIONAL ASSESSMENT: PAIN_FUNCTIONAL_ASSESSMENT: ACTIVITIES ARE NOT PREVENTED

## 2022-10-17 NOTE — PROGRESS NOTES
Post Partum Vaginal Delivery Progress Note      SUBJECTIVE:  No complaints      Vitals:  Vitals:    10/17/22 0803   BP: (!) 97/57   Pulse: 65   Resp: 16   Temp: 98.7 °F (37.1 °C)   SpO2: 96%        Exam:  Fundus firm, non-tender, normal lochia  Extremities non-tender      DATA:    CBC:    Lab Results   Component Value Date/Time    WBC 11.9 10/14/2022 10:40 PM    RBC 4.08 10/14/2022 10:40 PM    HGB 11.9 10/14/2022 10:40 PM    HCT 36.2 10/14/2022 10:40 PM    MCV 88.7 10/14/2022 10:40 PM    RDW 12.6 10/14/2022 10:40 PM     10/14/2022 10:40 PM       ASSESSMENT & PLAN:  PPD # 1  Patient Active Problem List   Diagnosis    Hypothyroidism    Primigravida in third trimester    40 weeks gestation of pregnancy    BMI 40.0-44.9, adult ([facility] [address]    Obesity affecting pregnancy in third trimester    Late prenatal care @ 21 weeks affecting pregnancy in third trimester    Hypothyroidism during pregnancy in third trimester     (normal spontaneous vaginal delivery)    Term birth of male     Second degree laceration of perineum, delivered, current hospitalization    Obstetric left vaginal walll vaginal laceration without perineal laceration    Right periurethral laceration, delivered, current hospitalization     Routine postpartum care

## 2022-10-17 NOTE — ANESTHESIA POSTPROCEDURE EVALUATION
Department of Anesthesiology  Postprocedure Note    Patient: Ethan Coleman  MRN: [id number]  YOB: 1990  Date of evaluation: 10/17/2022      Procedure Summary     Date: 10/15/22 Room / Location:     Anesthesia Start: 1400 Anesthesia Stop: 10/16/22 2218    Procedure: Labor Analgesia Diagnosis:     Scheduled Providers:  Responsible Provider: Toro Gonzalez MD    Anesthesia Type: general, spinal, epidural ASA Status: 2          Anesthesia Type: No value filed.     Nikhil Phase I:      Nikhil Phase II:        Anesthesia Post Evaluation    Patient location during evaluation: bedside  Patient participation: complete - patient participated  Level of consciousness: awake and alert  Pain score: 0  Airway patency: patent  Nausea & Vomiting: no nausea and no vomiting  Complications: no  Cardiovascular status: hemodynamically stable  Respiratory status: acceptable and room air  Hydration status: stable  Multimodal analgesia pain management approach

## 2022-10-17 NOTE — PROGRESS NOTES
[employer] [place] Hearing screening results were discussed with parent. Questions answered. Brochure given to parent. Advised to monitor developmental milestones and contact physician for any concerns.    Merlin Rosas

## 2022-10-17 NOTE — L&D DELIVERY NOTE
Department of Obstetrics and Gynecology  Spontaneous Vaginal Delivery Note    Patient:  Damaris Tubbs     Admit Date:  10/14/2022  9:22 PM  Medical Record Number:  [id number]   Procedure Date: 10/16/2022 11:52 PM     Pre-delivery Diagnosis: Primigravida IUP at 36 weeks, late prenatal care (21 weeks), obesity in pregnancy (BMI 42.61), hypothyroidism  Patient Active Problem List   Diagnosis    Hypothyroidism    Primigravida in third trimester    40 weeks gestation of pregnancy    BMI 40.0-44.9, adult ([address]    Obesity affecting pregnancy in third trimester    Late prenatal care @ 21 weeks affecting pregnancy in third trimester    Hypothyroidism during pregnancy in third trimester       Post-delivery Diagnosis:  Living  infant Male, second-degree perineal laceration, left vaginal wall laceration, right periurethral laceration  Patient Active Problem List   Diagnosis    Hypothyroidism    Primigravida in third trimester    40 weeks gestation of pregnancy    BMI 40.0-44.9, adult ([address]    Obesity affecting pregnancy in third trimester    Late prenatal care @ 21 weeks affecting pregnancy in third trimester    Hypothyroidism during pregnancy in third trimester     (normal spontaneous vaginal delivery)    Term birth of male     Second degree laceration of perineum, delivered, current hospitalization    Obstetric left vaginal walll vaginal laceration without perineal laceration    Right periurethral laceration, delivered, current hospitalization       Information for the patient's :  Jun Garduno [[id number]]   Normal Spontaneous Vaginal Delivery, uncomplicated     Delivering Clinician: Rl Wesley Wt:   Information for the patient's :  Dane Singh Otilio Borrero [[id number]]   7 pounds 11 ounces  3480 g     APGARS:     Information for the patient's :  Jun Garduno [[id number]]   1 minute: 9  5 minute: 9    Anesthesia:  epidural anesthesia with 1% lidocaine locally for repair    Application and Delivery:  28 y.o. W female  at 37w0d admitted for Cytotec cervical ripening/labor induction (BS = 3) for pregnancy complicated by late prenatal care at 21 weeks, obesity (BMI 42.61) and hypothyroidism who progressed to complete and delivered Cephalic, left occiput anterior presentation via  @ 2218, under epidural anesthesia anesthesia,  over an intact perineum with a resulting 2nd degree perineal laceration, a left vaginal wall mucosal laceration and a right periurethral mucosal laceration, without dystocia or complication, a Live Born Male infant, weighing 7# 11oz, 3500 grams, Clear fluid at delivery, bulb suctioned on perineum. A nuchal cord was not present. A delayed cord clamping was performed. Spontaneous cry,  Apgar's 1 minute:  9; 5 minute:  9. The placenta was delivered with gentle traction and was noted to be intact, whole, and that the umbilical cord had three vessels noted. Episiotomy was not needed due to a spontaneous 2nd degree (subcutaneous tissue involved) perineal/vaginal laceration and left vaginal wall mucosal laceration and a right periurethral mucosal laceration. Repair of the perineal laceration performed in layers, per routine with  Vicryl 3-0 suture. The left vaginal wall mucosal laceration was repaired with a running lock stitch of the same suture while the right periurethral mucosal laceration was repaired with simple interrupted stitches of the same. Cervix, rectum, sphincter intact. Sponge, needle, and instrument counts correct x 2.     Delivery Summary:  Mother's Information      Labor Events     Labor?: No  Cervical Ripening: Cytotec x3 doses  Now     Neville Santizo Otilio Lee [[id number]]      Labor Events     Labor?: No   Steroids?: None  Cervical Ripening Date/Time: 10/14/22  10/15/22  22:29:00  02:33:00 and 07:32:00   Cervical Ripening Type: Misoprostol, Amin/EASI  Antibiotics Received during Labor?: No  Rupture Date/Time: 10/16/22 08:10:00   Rupture Type: AROM  Fluid Color: Clear, Bloody Show  Fluid Odor: None  Fluid Volume: Moderate  Induction: Cervical Ripening Balloon, Misoprostol  Augmentation: Oxytocin, AROM  Labor Complications: None    Anesthesia    Metsame    Aod: Epidural, Local    Start Pushing      Labor onset date/time: 10/16/22 08:10:00 Now     Dilation complete date/time: 10/16/22 21:48:00 Now     Start pushing date/time: 10/16/2022 21:50:00     Labor Length    1st stage: 13h 38m  2nd stage: 0h 30m  3rd stage: 0h 07m    Delivery ([place])      Delivery Date/Time:  10/16/22 22:18:00   Delivery Type: Vaginal, Spontaneous     Presentation    Presentation: Vertex  Position: Left  _: Occiput  _: Anterior    Shoulder Dystocia    Shoulder Dystocia Present?: No    Assisted Delivery Details    Forceps Attempted?: No  Vacuum Extractor Attempted?: No    Cord    Vessels: 3 Vessels  Complications: None  Delayed Cord Clamping?: Yes  Cord Clamped Date/Time: 10/16/2022 22:19:00  Cord Blood Disposition: Lab  Gases Sent?: Yes    Placenta    Date/Time: 10/16/2022 22:25:00  Removal: Spontaneous  Appearance: Intact  Disposition: Placenta Refrigerator    Lacerations    Episiotomy: None  Perineal Lacerations: 2nd  Other Lacerations: periurethral laceration, vaginal laceration  Periurethral Laceration: Right Repaired?: Yes     Vaginal Laceration?: Yes Repaired?: Yes   Number of Repair Packets: 2    Vaginal Counts    Initial Count Verified By: Ruel Henderson    Sponges Needles Instruments   Initial Counts Correct Correct Correct   Final Counts Correct Correct Correct   Final Count Personnel: Ruel Henderson  Final Count Verified By: GZSYMIR RN  Accurate Final Count?: Yes  If the count is incorrect due to Intentionally Retained Foreign Object (IRFO) add the IRFO LDA in Lines/Drains.   Add LDA: Link to [facility]    Blood Loss  Mother: Marcelino Romero #[id number]     Start of Mother's Information      Delivery Blood Loss  10/16/22 0810 - 10/17/22 0935      200 mL       End of Mother's Information  Mother: Kayla Acevedo #[id number]      Delivery Providers    Delivering clinician: Janeth Wilkins MD     Provider Role    Janeth Wilkins MD Obstetrician    Christopher Bravo, RN Primary Nurse    Sunni Cruz, RN Primary  Nurse         [place] Assessment    Living Status: Living  Delivery Location Comment: LD10     Apgar Scoring Key:    0 1 2    Skin Color: Blue or pale Acrocyanotic Completely pink    Heart Rate: Absent <100 bpm >100 bpm    Reflex Irritability: No response Grimace Cry or active withdrawal    Muscle Tone: Limp Some flexion Active motion    Respiratory Effort: Absent Weak cry; hypoventilation Good, crying                Skin Color:   Heart Rate:   Reflex Irritability:   Muscle Tone:   Respiratory Effort: Total:            1 Minute:    1    2    2    2    2    9        Apgar 1 total from OB History    5 Minute:    1    2    2    2    2    9        Apgar 5 total from OB History              Apgars Assigned By: KAMILA RN         Resuscitation    Method: Bulb Suction, Stimulation      [place] Measurements      Birth Weight: 3480 g Birth Length: 0.508 m     Head Circumference: 0.345 m          Title      Skin to Skin Initiation Date/Time: 10/16/22 22:18:00 EDT     Skin to Skin With: Mother     Skin to Skin End Date/Time: 10/16/22 23:00:00        Specimen:  None. Estimated blood loss: 200 mL    Condition:  infant stable to general nursery and mother stable to maternity    Blood Type and Rh: A POS    Rubella Immunity Status:   Immune           Infant Feeding:    breast    Attending Attestation: I performed the procedure.     Janeth Wilkins MD MD, [address] [place] [address]  10/16/2022 11:52 PM

## 2022-10-17 NOTE — LACTATION NOTE
Pt is a primip with unspecified BF goals. She has fed formula since delivery and will appreciate lactation support. Baby gagging and spitting clear fluid. Baby placed skin to skin. Encouraged skin to skinwith frequent attempts at breast to stimulate milk production. Instructed on normal infant behavior in the first 12-24 hours and importance of stimulating the baby frequently to eat during this time. Reviewed hand expression, and encouraged to hand express drops of colostrum when baby is sleepy. Instructed that baby may also feed 8-12 times a day- cluster feeding at times- as her milk supply is being established. Instructed on benefits of skin to skin and avoidance of pacifier / artificial nipple use until breastfeeding is well established. Educated on making sure infant has an open airway while breastfeeding and skin to skin. Instructed on hunger cues and waking techniques to try. Reviewed signs of adequate I & O; allow baby to feed ad baldo and not to limit time at breast. Breastfeeding booklet provided with review of its contents. Encouraged to call with any concerns. Patient requests Electronic breast pump for home use to increase breast milk supply.

## 2022-10-17 NOTE — DISCHARGE INSTRUCTIONS
How to Breastfeed: Step by Step  Overview  Breastfeeding is a skill that gets better with practice. Breastfeed your baby whenever your baby is hungry. In the first 2 weeks, your baby will feed at least 8 times in a 24-hour period. Here is a step-by-step guide on how to breastfeed. It shows just one position that you can use for breastfeeding. Talk to your doctor, midwife, or lactation consultant if you are having trouble getting your baby to latch on. How to Breastfeed  Get ready to breastfeed    Sit in a comfortable chair. Support your baby on a pillow on your lap. Support your breast    Support and narrow your breast with one hand using a \"U hold. \" Your thumb will be on the outer side of your breast. Your fingers will be on the inner side. You can also use a \"C hold,\" with all your fingers below the nipple and your thumb above it. Position your baby    Your other arm is behind your baby's back, with your hand supporting the base of the baby's head. Point your fingers and thumb toward your baby's ears. Get baby to open mouth    Touch your baby's Upper lip with your nipple to get your baby's mouth to open. Wait until your baby opens up really wide, like a big yawn. Bring the baby quickly to your breast--not your breast to the baby. Guide your breast into your baby's mouth. Listen for sucking sounds    The nipple and a large part of the darker area around the nipple (areola) should be in the baby's mouth. The baby's lips should be flared out, not folded in. Listen for regular sucking and swallowing sounds while the baby is feeding. If you cannot see or hear swallowing, watch your baby's ears. They will wiggle slightly when the baby swallows. How to break the latch    If you need to take your baby off your breast (for example, to reposition), you will need to break the baby's latch on your nipple. To break your baby's latch, put one finger in the corner of your baby's mouth.   Push your finger between your baby's gums to gently break the latch. If you don't break the latch before you remove your baby, your nipples can become sore, cracked, or bruised. Where can you learn more? Go to https://yolanda.healthToolwi. org and sign in to your [employer] account. Enter A002 in the [facility] box to learn more about \"How to Breastfeed: Step by Step. \"     If you do not have an account, please click on the \"Sign Up Now\" link. Current as of: 2021               Content Version: 13.0  © 8441-6139 [employer]. Care instructions adapted under license by [facility] ([facility]). If you have questions about a medical condition or this instruction, always ask your healthcare professional. [address] any warranty or liability for your use of this information. Follow-up with your OB doctor in 1 week if  delivery or in  6 weeks for vaginal delivery unless otherwise instructed. Call office for an appointment. For breastfeeding support, you can contact our lactation specialists at [phone] or [phone]    DIET  Eat a well balanced diet focusing on foods high in fiber and protein  Drink plenty of fluids especially water. To avoid constipation you may take a mild stool softener as recommended by your doctor or midwife. ACTIVITY  Gradually increase your activity. Resume exercise regimen only after advised by your doctor or midwife. Avoid lifting anything heavier than your baby or a gallon of milk for SIX weeks. Avoid driving until your doctor or midwife has given their approval.  [employer] Linen slowly from a lying to sitting and then a standing position. Climb stairs one at a time. Use caution when carrying your baby up and down the stairs. No sexual activity for 6 weeks or until advised by your doctor - Nothing in vagina: intercourse, tampons, or douching. Be prepared to discuss family planning at your follow-up OB visit.    You may feel tired or have a lack of energy. You may continue your prenatal vitamin to replenish nutrients post delivery. Nap when baby naps to catch up on sleep. May return to work or school in 6 weeks or as directed by OB. EMOTIONS  You may feed magana, sad, teary, & overwhelmed. Contact your OB provider if you feel you may be showing signs of postpartum depression, or have thoughts of harming yourself or your infant. If infant will not stop crying, contact another adult for help or place infant in their crib on their back and take a break. NEVER shake your infant. BLEEDING  Vaginal bleeding will decrease in amount over the next few weeks. You will notice that as your activity increases, your flow may increase. This is your body's way of telling you, you need to take things easier and rest more often. Call your OB/ER if you are saturating more than one maxi pad in an hour. BREAST CARE  Take medications as recommended by your doctor or midwife for pain  If you develop a warm, red, tender area on your breast or develop a fever contact your OB provider. For breastfeeding moms:  If you become engorged, feeding may be more difficult or painful for 1-2 days. You may find it helpful to hand express some milk so that the infant can latch on more easily. While breastfeeding, continue to take your prenatal vitamins as directed by your doctor or midwife. Only take medications verified as safe for breastfeeding. For non-breastfeeding moms:  You may apply ice packs to your breasts over your bra for twenty minutes at a time for comfort. Avoid stimulation to your breasts, when showering allow the water to strike your back not your breasts. Wear a good fitting bra until your milk dries, such as a sports bra. INCISIONAL CARE / HENRIETTA CARE  Clean your incision in the shower with mild soap. After shower pat the incision area dry and leave open to air. If used, Steri-stipes should be removed by 2 weeks.   If used, Staples should be removed by the OB in office by 1 week. If used/ordered, an abdominal binder may provide support for your incision. Use the dereck-bottle after toileting until bleeding stops. Cleanse your perineum from front to back  If used, stitches or internal clips will dissolve in 4-6 weeks. You may use a sitz bath or soak in a clean tub as needed for comfort. Kegel exercises will help restore bladder control. SWELLING  Keep your legs elevated when sitting or lying. When wearing stocking or socks, make sure they are not too tight. WHEN TO CALL THE DOCTOR  If you have a temp of 100.4 or more. If your bleeding has increased and you are saturating a pad in an hour. Your abdomen is tender to touch. You are passing blood clots bigger than the size of a lemon. If you are experiencing extreme weakness or dizziness. If you are having flu-like symptoms such as achy muscles or joints. There is a foul smell or a green color to your vaginal bleeding. If you have pain that cannot be relieved. You have persistent burning with urination or frequency. Call if you have concerns about your well-being. You are unable to sleep, eat, or are having thoughts of harming yourself or your baby. You have swelling, bleeding, drainage, foul odor, redness, or warmth in/around your incision or stitches. You have a red, warm, tender area in you calf.

## 2022-10-18 VITALS
OXYGEN SATURATION: 98 % | DIASTOLIC BLOOD PRESSURE: 63 MMHG | RESPIRATION RATE: 16 BRPM | SYSTOLIC BLOOD PRESSURE: 124 MMHG | HEART RATE: 73 BPM | TEMPERATURE: 97.8 F

## 2022-10-18 PROCEDURE — 6370000000 HC RX 637 (ALT 250 FOR IP): Performed by: OBSTETRICS & GYNECOLOGY

## 2022-10-18 PROCEDURE — 99238 HOSP IP/OBS DSCHRG MGMT 30/<: CPT | Performed by: STUDENT IN AN ORGANIZED HEALTH CARE EDUCATION/TRAINING PROGRAM

## 2022-10-18 RX ORDER — PSEUDOEPHEDRINE HCL 30 MG
100 TABLET ORAL 2 TIMES DAILY
Qty: 90 CAPSULE | Refills: 1 | Status: SHIPPED | OUTPATIENT
Start: 2022-10-18

## 2022-10-18 RX ORDER — IBUPROFEN 600 MG/1
600 TABLET ORAL EVERY 6 HOURS PRN
Qty: 60 TABLET | Refills: 0 | Status: SHIPPED | OUTPATIENT
Start: 2022-10-18

## 2022-10-18 RX ADMIN — DOCUSATE SODIUM 100 MG: 100 CAPSULE, LIQUID FILLED ORAL at 09:07

## 2022-10-18 RX ADMIN — METFORMIN HYDROCHLORIDE 1 TABLET: 500 TABLET, EXTENDED RELEASE ORAL at 09:06

## 2022-10-18 RX ADMIN — IBUPROFEN 600 MG: 600 TABLET ORAL at 00:14

## 2022-10-18 RX ADMIN — LEVOTHYROXINE SODIUM 75 MCG: 75 TABLET ORAL at 07:05

## 2022-10-18 RX ADMIN — IBUPROFEN 600 MG: 600 TABLET ORAL at 09:07

## 2022-10-18 ASSESSMENT — PAIN SCALES - GENERAL
PAINLEVEL_OUTOF10: 2
PAINLEVEL_OUTOF10: 5
PAINLEVEL_OUTOF10: 4

## 2022-10-18 ASSESSMENT — PAIN DESCRIPTION - ORIENTATION
ORIENTATION: LOWER
ORIENTATION: LOWER

## 2022-10-18 ASSESSMENT — PAIN DESCRIPTION - DESCRIPTORS
DESCRIPTORS: ACHING
DESCRIPTORS: ACHING;CRAMPING

## 2022-10-18 ASSESSMENT — PAIN DESCRIPTION - LOCATION
LOCATION: ABDOMEN
LOCATION: ABDOMEN

## 2022-10-18 ASSESSMENT — PAIN - FUNCTIONAL ASSESSMENT
PAIN_FUNCTIONAL_ASSESSMENT: ACTIVITIES ARE NOT PREVENTED
PAIN_FUNCTIONAL_ASSESSMENT: ACTIVITIES ARE NOT PREVENTED

## 2022-10-18 ASSESSMENT — PAIN DESCRIPTION - FREQUENCY: FREQUENCY: INTERMITTENT

## 2022-10-18 ASSESSMENT — PAIN DESCRIPTION - ONSET: ONSET: ON-GOING

## 2022-10-18 ASSESSMENT — PAIN DESCRIPTION - PAIN TYPE: TYPE: ACUTE PAIN

## 2022-10-18 NOTE — PROGRESS NOTES
CLINICAL PHARMACY NOTE: MEDS TO BEDS    Total # of Prescriptions Filled: 2   The following medications were delivered to the patient:  Ibuprofen 600 mg  Docusate 100 mg    Additional Documentation:

## 2022-10-18 NOTE — PROGRESS NOTES
Mother instructed on infant's discharge instructions with verbalized understanding. Questions answered prn. Mother signed infant's discharge instructions and was given a copy for her records. Final ID band check completed with mother and infant. Correct ID confirmed. Hugs tag disabled and removed. Patient instructed on discharge instructions with verbalized understanding. Questions answered prn.

## 2022-10-19 ENCOUNTER — TELEPHONE (OUTPATIENT)
Dept: INTERNAL MEDICINE | Age: 32
End: 2022-10-19

## 2022-10-19 LAB
C. TRACHOMATIS DNA ,URINE: NEGATIVE
N. GONORRHOEAE DNA, URINE: NEGATIVE
SOURCE: NORMAL

## 2022-10-19 NOTE — LETTER
Internal [facility]  [address] [facility], [address] [place] [address]  Phone:  [phone]  Fax:  [phone]    10/19/2022     Juan Cano   [address] [address]      Dear Jean Pierson:    Your health needs and well-being are important to us. More than six months have passed since your last appointment here at [facility]. To process refills or paperwork, an appointment is necessary. To schedule, contact us at [phone]. If you are seeing a new provider, please call this same number and we will update your record to reflect this change. If you are experiencing needs that impede your ability to schedule an appointment, please inform us. Resources are available within the practice to assist you. We look forward to hearing from you soon.        Sincerely,      The Staff at the [address] [place] [address]

## 2022-11-03 ENCOUNTER — OFFICE VISIT (OUTPATIENT)
Dept: OBGYN | Age: 32
End: 2022-11-03
Payer: MEDICAID

## 2022-11-03 VITALS — SYSTOLIC BLOOD PRESSURE: 124 MMHG | DIASTOLIC BLOOD PRESSURE: 70 MMHG

## 2022-11-03 DIAGNOSIS — D24.2 FIBROADENOMA OF BREAST, LEFT: Primary | ICD-10-CM

## 2022-11-03 PROBLEM — O99.213 OBESITY AFFECTING PREGNANCY IN THIRD TRIMESTER: Status: RESOLVED | Noted: 2022-10-17 | Resolved: 2022-11-03

## 2022-11-03 PROBLEM — O09.33 LATE PRENATAL CARE AFFECTING PREGNANCY IN THIRD TRIMESTER: Status: RESOLVED | Noted: 2022-10-17 | Resolved: 2022-11-03

## 2022-11-03 PROBLEM — Z3A.40 40 WEEKS GESTATION OF PREGNANCY: Status: RESOLVED | Noted: 2022-10-10 | Resolved: 2022-11-03

## 2022-11-03 PROBLEM — O99.283 HYPOTHYROIDISM DURING PREGNANCY IN THIRD TRIMESTER: Status: RESOLVED | Noted: 2022-10-17 | Resolved: 2022-11-03

## 2022-11-03 PROBLEM — Z34.03 PRIMIGRAVIDA IN THIRD TRIMESTER: Status: RESOLVED | Noted: 2022-10-10 | Resolved: 2022-11-03

## 2022-11-03 PROBLEM — E03.9 HYPOTHYROIDISM DURING PREGNANCY IN THIRD TRIMESTER: Status: RESOLVED | Noted: 2022-10-17 | Resolved: 2022-11-03

## 2022-11-03 PROCEDURE — 99213 OFFICE O/P EST LOW 20 MIN: CPT | Performed by: OBSTETRICS & GYNECOLOGY

## 2022-11-03 PROCEDURE — 99212 OFFICE O/P EST SF 10 MIN: CPT

## 2022-11-03 NOTE — PROGRESS NOTES
Consult pt has hx of left breast fibroadenoma and since delivered 10/15 just concerned and would like it checked.

## 2022-11-03 NOTE — PROGRESS NOTES
28 y.o.  female who had a  on 10-15 2022 presents with a 2-year history of a left breast fibroadenoma found while she was in [facility]. She had a mammogram and an ultrasound and was told to follow-up in 6 months but then became pregnant. She is currently breast-feeding. She denies any pain in the breast no change in the size of the fibroadenoma    Onset 2 years ago           Duration 2 years left upper breast    LOCATION left breast    Character of mass nontender mobile    NIPPLE DC none    PAIN none    BREASTFEEDING STATUS yes    REPRODUCTIVE HX  10-15    Cancer-related family history includes Cancer in her maternal grandmother and paternal grandmother. PRIOR IMAGING AND RESULTS mammogram and ultrasound fibroadenoma    PRIOR BX previous biopsy    Prior breast surgery/injury none    Blood pressure 124/70, last menstrual period 2022, currently breastfeeding. Breast exam  Right breast is nontender no masses no supraclavicular or axillary nodes no dimpling no retraction no erythema   left breast at the 12 o'clock position there is a mobile 3 x 3 cm mass no dimpling no retraction no supraclavicular or axillary nodes. Pilonia Bestrachid was seen today for consultation. Diagnoses and all orders for this visit:    Fibroadenoma of breast, left  We will see her back for her postpartum visit we will reassess at that time and determine whether mammogram ultrasound needs to be performed. We also offered her consultation with breast surgeon for biopsy or removal at this point she wishes to continue with expectant management. She plans to breast-feed for 2 years.

## 2022-11-23 ENCOUNTER — POSTPARTUM VISIT (OUTPATIENT)
Dept: OBGYN | Age: 32
End: 2022-11-23
Payer: MEDICAID

## 2022-11-23 VITALS — WEIGHT: 242 LBS | BODY MASS INDEX: 42.88 KG/M2 | HEIGHT: 63 IN

## 2022-11-23 DIAGNOSIS — E03.8 OTHER SPECIFIED HYPOTHYROIDISM: ICD-10-CM

## 2022-11-23 PROCEDURE — 99214 OFFICE O/P EST MOD 30 MIN: CPT | Performed by: OBSTETRICS & GYNECOLOGY

## 2022-11-23 ASSESSMENT — ENCOUNTER SYMPTOMS
BLOOD IN STOOL: 0
WHEEZING: 0
CONSTIPATION: 0
SHORTNESS OF BREATH: 0
ABDOMINAL PAIN: 0
NAUSEA: 0
DIARRHEA: 0
VOMITING: 0
COUGH: 0

## 2022-11-23 NOTE — PROGRESS NOTES
Pt here for post partum visit. Bakari Santizo pt denies breast concerns and is breast concerns. Pt noticed small lump in vaginal  area. Thinks like a pimple and does not hurt. Pt is going to use condoms.

## 2022-11-23 NOTE — PROGRESS NOTES
Sydni Coats is a 29-year-old G1, P3 female who had an  on 10- a male infant weighing 7 pounds 10 ounces. Her pregnancy was not complicated by HTN or GDM, she did have hypothyroidism. She is breast-feeding with no concerns she denies anxiety depression or suicidal ideation. She denies any family history changes during pregnancy or new allergies. She is going to be traveling to [facility] in the next week. She and her partner are planning on using condoms. She does not believe she has ever had a Pap. Patient presents for annual exam.     Past Medical History:   Diagnosis Date    Hypothyroidism         Past Surgical History:   Procedure Laterality Date    FOOT SURGERY      left foot and leg stepped on a needle        Family History   Problem Relation Age of Onset    Kidney Disease Paternal Grandfather     Cancer Paternal Grandmother     Cancer Maternal Grandmother     Hypertension Maternal Grandfather     Diabetes Maternal Grandfather     Diabetes Father     Hemophilia Father     Hemophilia Mother           Current Outpatient Medications:     Prenatal Vit-Fe Fumarate-FA (PRENATAL PLUS) 27-1 MG TABS, Take 1 tablet by mouth daily, Disp: 90 tablet, Rfl: 3    levothyroxine (SYNTHROID) 75 MCG tablet, Take 1 tablet by mouth daily (Patient taking differently: Take 75 mcg by mouth daily Currently taking), Disp: 90 tablet, Rfl: 1     No Known Allergies     Social History       Tobacco History       Smoking Status  Never      Smokeless Tobacco Use  Never              Alcohol History       Alcohol Use Status  Never              Drug Use       Drug Use Status  Never              Sexual Activity       Sexually Active  Yes Partners  Male Comment                       Review of Systems   Constitutional:  Negative for fever. HENT:  Negative for hearing loss. Eyes:  Negative for visual disturbance. Respiratory:  Negative for cough, shortness of breath and wheezing.     Cardiovascular:  Negative for chest pain and palpitations. Gastrointestinal:  Negative for abdominal pain, blood in stool, constipation, diarrhea, nausea and vomiting. Genitourinary:  Negative for dysuria, enuresis, frequency, hematuria and urgency. Musculoskeletal:  Positive for arthralgias. Negative for myalgias. Skin:  Negative for rash. Neurological:  Negative for headaches. Hematological:  Does not bruise/bleed easily. Psychiatric/Behavioral:  Negative for agitation, dysphoric mood, self-injury, sleep disturbance and suicidal ideas. The patient is not nervous/anxious. There were no vitals filed for this visit. Physical Exam  Genitourinary:      Genitourinary Comments: No exam today she is breast-feeding      Right Labia: lesions (Epidermal cyst). Right Labia: No Bartholin's cyst.     Left Labia: No Bartholin's cyst.     No vaginal discharge or bleeding. No vaginal prolapse present. No vaginal atrophy present. Right Adnexa: not tender and no mass present. Left Adnexa: not tender and no mass present. No cervical discharge, friability or lesion. Uterus is not enlarged, tender, irregular or prolapsed. No urethral prolapse present. Levator ani not tender and obturator internus not tender. Neck:      Thyroid: No thyroid mass or thyromegaly. Vascular: No carotid bruit. Cardiovascular:      Rate and Rhythm: Normal rate and regular rhythm. Pulmonary:      Effort: Pulmonary effort is normal.      Breath sounds: Normal breath sounds. Abdominal:      Palpations: Abdomen is soft. There is no mass. Tenderness: There is no abdominal tenderness. Hernia: No hernia is present. Lymphadenopathy:      Cervical: No cervical adenopathy. Jean Pierson was seen today for postpartum care. Diagnoses and all orders for this visit:    6 weeks postpartum follow-up  -     PAP SMEAR    Other specified hypothyroidism  -     TSH;  Future    We reviewed signs and symptoms of postpartum depression and when to call. She and her partner are going to use condoms for contraception. We will call with Pap results. We will call with TSH level and make adjustments in Synthroid if necessary. Reviewed signs and symptoms of mastitis and when to call.     Return in about 1 year (around 11/23/2023) for annual.     George Gilmore,

## 2022-11-29 ENCOUNTER — TELEPHONE (OUTPATIENT)
Dept: OBGYN | Age: 32
End: 2022-11-29

## 2022-11-29 NOTE — TELEPHONE ENCOUNTER
Pt called and was looking at pap on her my chart and did not understand resulst. Explained to pt her pap and hpv were both neg. Pt voiced understanding.

## 2022-11-29 NOTE — TELEPHONE ENCOUNTER
----- Message from Solomon Villegas DO sent at 11/29/2022  4:15 PM EST -----  Please let her know her Pap and HPV are both negative

## 2022-12-05 ENCOUNTER — TELEPHONE (OUTPATIENT)
Dept: OBGYN | Age: 32
End: 2022-12-05

## 2022-12-05 NOTE — TELEPHONE ENCOUNTER
Called pt advised pap and hpv both negative at her post partum visit per dr. Zully Kapoor. Pt voiced understanding.

## 2022-12-05 NOTE — TELEPHONE ENCOUNTER
----- Message from Manjit Goldstein DO sent at 11/29/2022  4:15 PM EST -----  Please let her know her Pap and HPV are both negative

## 2023-05-16 SDOH — ECONOMIC STABILITY: INCOME INSECURITY: HOW HARD IS IT FOR YOU TO PAY FOR THE VERY BASICS LIKE FOOD, HOUSING, MEDICAL CARE, AND HEATING?: PATIENT DECLINED

## 2023-05-16 SDOH — ECONOMIC STABILITY: FOOD INSECURITY: WITHIN THE PAST 12 MONTHS, YOU WORRIED THAT YOUR FOOD WOULD RUN OUT BEFORE YOU GOT MONEY TO BUY MORE.: PATIENT DECLINED

## 2023-05-16 SDOH — ECONOMIC STABILITY: FOOD INSECURITY: WITHIN THE PAST 12 MONTHS, THE FOOD YOU BOUGHT JUST DIDN'T LAST AND YOU DIDN'T HAVE MONEY TO GET MORE.: PATIENT DECLINED

## 2023-05-16 SDOH — ECONOMIC STABILITY: HOUSING INSECURITY
IN THE LAST 12 MONTHS, WAS THERE A TIME WHEN YOU DID NOT HAVE A STEADY PLACE TO SLEEP OR SLEPT IN A SHELTER (INCLUDING NOW)?: PATIENT REFUSED

## 2023-05-16 SDOH — ECONOMIC STABILITY: TRANSPORTATION INSECURITY
IN THE PAST 12 MONTHS, HAS LACK OF TRANSPORTATION KEPT YOU FROM MEETINGS, WORK, OR FROM GETTING THINGS NEEDED FOR DAILY LIVING?: PATIENT DECLINED

## 2023-05-17 ENCOUNTER — OFFICE VISIT (OUTPATIENT)
Dept: INTERNAL MEDICINE | Age: 33
End: 2023-05-17

## 2023-05-17 ENCOUNTER — SOCIAL WORK (OUTPATIENT)
Dept: INTERNAL MEDICINE | Age: 33
End: 2023-05-17

## 2023-05-17 VITALS
RESPIRATION RATE: 18 BRPM | BODY MASS INDEX: 44.47 KG/M2 | TEMPERATURE: 97.6 F | OXYGEN SATURATION: 99 % | SYSTOLIC BLOOD PRESSURE: 138 MMHG | DIASTOLIC BLOOD PRESSURE: 72 MMHG | HEIGHT: 63 IN | WEIGHT: 251 LBS | HEART RATE: 97 BPM

## 2023-05-17 DIAGNOSIS — N63.10 MASSES OF BOTH BREASTS: ICD-10-CM

## 2023-05-17 DIAGNOSIS — Z32.01 PREGNANCY TEST POSITIVE: Primary | ICD-10-CM

## 2023-05-17 DIAGNOSIS — Z59.9 FINANCIAL DIFFICULTIES: ICD-10-CM

## 2023-05-17 DIAGNOSIS — E03.9 HYPOTHYROIDISM, UNSPECIFIED TYPE: ICD-10-CM

## 2023-05-17 DIAGNOSIS — N63.20 MASSES OF BOTH BREASTS: ICD-10-CM

## 2023-05-17 LAB
CONTROL: NORMAL
PREGNANCY TEST URINE, POC: POSITIVE

## 2023-05-17 PROCEDURE — 99213 OFFICE O/P EST LOW 20 MIN: CPT | Performed by: STUDENT IN AN ORGANIZED HEALTH CARE EDUCATION/TRAINING PROGRAM

## 2023-05-17 PROCEDURE — 99212 OFFICE O/P EST SF 10 MIN: CPT | Performed by: STUDENT IN AN ORGANIZED HEALTH CARE EDUCATION/TRAINING PROGRAM

## 2023-05-17 PROCEDURE — 81025 URINE PREGNANCY TEST: CPT | Performed by: STUDENT IN AN ORGANIZED HEALTH CARE EDUCATION/TRAINING PROGRAM

## 2023-05-17 RX ORDER — LEVOTHYROXINE SODIUM 0.1 MG/1
100 TABLET ORAL DAILY
Qty: 90 TABLET | Refills: 1 | Status: SHIPPED | OUTPATIENT
Start: 2023-05-17

## 2023-05-17 RX ORDER — FOLIC ACID 1 MG/1
1 TABLET ORAL DAILY
COMMUNITY

## 2023-05-17 RX ORDER — LEVOTHYROXINE SODIUM 0.1 MG/1
100 TABLET ORAL DAILY
COMMUNITY
End: 2023-05-17 | Stop reason: SDUPTHER

## 2023-05-17 RX ORDER — ASPIRIN 81 MG/1
81 TABLET ORAL DAILY
COMMUNITY

## 2023-05-17 SDOH — ECONOMIC STABILITY - INCOME SECURITY: PROBLEM RELATED TO HOUSING AND ECONOMIC CIRCUMSTANCES, UNSPECIFIED: Z59.9

## 2023-05-17 ASSESSMENT — PATIENT HEALTH QUESTIONNAIRE - PHQ9
SUM OF ALL RESPONSES TO PHQ QUESTIONS 1-9: 0
1. LITTLE INTEREST OR PLEASURE IN DOING THINGS: 0
SUM OF ALL RESPONSES TO PHQ QUESTIONS 1-9: 0
SUM OF ALL RESPONSES TO PHQ9 QUESTIONS 1 & 2: 0
2. FEELING DOWN, DEPRESSED OR HOPELESS: 0

## 2023-05-17 ASSESSMENT — LIFESTYLE VARIABLES
HOW OFTEN DO YOU HAVE A DRINK CONTAINING ALCOHOL: NEVER
HOW MANY STANDARD DRINKS CONTAINING ALCOHOL DO YOU HAVE ON A TYPICAL DAY: PATIENT DOES NOT DRINK

## 2023-05-17 ASSESSMENT — ENCOUNTER SYMPTOMS
VOMITING: 0
BACK PAIN: 1
SORE THROAT: 0
SHORTNESS OF BREATH: 0
ABDOMINAL PAIN: 0
CONSTIPATION: 0
COUGH: 0
WHEEZING: 0
NAUSEA: 0
DIARRHEA: 0

## 2023-05-17 NOTE — PATIENT INSTRUCTIONS
Dear Damaris Tubbs,    Thank you for coming to your appointment today. I hope we have addressed all of your needs. Please make sure to do the following:  - Continue your medications as listed. - Get labs drawn before our next follow up. - You will be called to schedule the breast ultrasound  - We will see each other again in 3 months with PCP    Call for a sooner appointment if you develop any new or worsening symptoms. Have a great day!     Sincerely,  Aba Gómez M.D  5/17/2023  4:18 PM

## 2023-05-17 NOTE — PROGRESS NOTES
[facility] [address]  Internal Medicine Residency Program  [facility] Note      SUBJECTIVE:  CC: had concerns including Follow-up (States 4 month pregnant and states she felt something in left breast, wants checked out). HPI:  Nuria Dykes is a 28 y. o.female with PMH of Hypothyroidism, Left Breast Lump (fibroadenoma), Chronic Knee pain bilaterally, Vitamin D deficiency presenting to [facility] for follow up visit. States here because thinks she is pregnant, LMP 2/2023, POCT urine pregnancy test positive in office today. Patient just coming back from [facility], getting reestablished here. Also concerned about left breast mass. Had fibroadenoma seen on mammogram done in [facility] 2 years ago, was supposed to get repeat imaging in 6 months, but became pregnant and never had it done, now she is pregnant again and wants it addressed since the mass persists. On evaluation has fibrous masses on bilateral breasts, pt agreeable with proceeding with ultrasound. Hx Hypothyroidism currently on Synthroid 100 mcg daily, needs refill and repeat TSH and T4 levels since recently increased from 75 to 100 mcg. Reported some back pain, mainly paraspinal, started after having children and having to pick them up, worse when picking them up. Declines further evaluation. Review of Systems   Constitutional:  Negative for activity change, appetite change, chills, fatigue, fever and unexpected weight change. HENT:  Negative for sore throat. Respiratory:  Negative for cough, shortness of breath and wheezing. Cardiovascular:  Negative for chest pain, palpitations and leg swelling. Gastrointestinal:  Negative for abdominal pain, constipation, diarrhea, nausea and vomiting. Genitourinary:  Negative for difficulty urinating and dysuria. Musculoskeletal:  Positive for back pain. Neurological:  Negative for dizziness, syncope, light-headedness and headaches. Psychiatric/Behavioral:  Negative for behavioral problems.

## 2023-05-19 NOTE — PROGRESS NOTES
Late Entry    Consult from Dr Mirza Mejia during 5.17.23 IM appt as she was requesting documentation re:pregnancy  Met with pt who was polite and engaged. She is 29 yo ,  and presently residing together with her parents, 31 yo brother and her 7 month old son. States has been in [place] for 5 years and is a permanent resident. Pt reports her  is in the country of [facility] presently in hopes of coming to [address] soon. Pt had been in [facility] for months prior as well. Pt states she is currently 13 weeks pregnant with twin pregnancy and received care while in [facility]. Pt states she is happy regarding pregnancy and reflects on tiredness of pregnancy and caring for 7 month old. Has supportive family including her parents as well as a sister who lives locally and is pregnant as well. Advised pt IM could give letter verifyng 5.17.23 positive pregnancy test and stated LMP; further proof and number of fetuses would need verified by OB for which she has appt w Dr Alyssa Storm on 5.31.23.   Letter provided by Dr Mirza Mejia

## 2023-05-19 NOTE — PROGRESS NOTES
[facility]  Internal Medicine Residency Clinic    Attending Physician Statement  I have discussed the case, including pertinent history and exam findings with the resident physician. I have seen and examined the patient and the key elements of the encounter have been performed by me. I agree with the assessment, plan and orders as documented by the resident. I have reviewed all pertinent PMHx, PSHx, FamHx, SocialHx, medications, and allergies and updated history as appropriate. Patient here for complaint of breast lumps. Patient states that she has a history of fibroadenomas but she is worried about what these may represent. Suspect these to be fibroadenomas as well given exam. Multiple areas noted with fibrous change on exam.   As patient is pregnant - will order B US of breast.     Hypothyroidism - on levothyroxine. Needs check of TSH   Will need this closely followed throughout her pregnancy. Pregnancy - patient now pregnant   Continue prenatal vitamin    GYN follow-up. I was present for the breast exam.  No LAD appreciated. No dimpling or puckering of skin. No nipple discharge. Multiple small areas noted with some discomfort to patient. No fixed masses palpated. Remainder of medical problems as per resident note.     Maninder Mendes MD  5/19/2023 12:16 PM

## 2023-05-24 ENCOUNTER — HOSPITAL ENCOUNTER (OUTPATIENT)
Dept: GENERAL RADIOLOGY | Age: 33
Discharge: HOME | End: 2023-05-26
Payer: MEDICAID

## 2023-05-24 ENCOUNTER — TELEPHONE (OUTPATIENT)
Dept: INTERNAL MEDICINE | Age: 33
End: 2023-05-24

## 2023-05-24 DIAGNOSIS — N63.10 MASSES OF BOTH BREASTS: ICD-10-CM

## 2023-05-24 DIAGNOSIS — N63.20 MASSES OF BOTH BREASTS: ICD-10-CM

## 2023-05-24 PROCEDURE — 76642 ULTRASOUND BREAST LIMITED: CPT

## 2023-05-24 NOTE — TELEPHONE ENCOUNTER
Called patient discuss breast ultrasound results. Discussed there is one suspicious mass on her left breast that will necessitate biopsy, which is scheduled for 5/30. Pt in agreement, and states understands the plan.

## 2023-05-30 ENCOUNTER — HOSPITAL ENCOUNTER (OUTPATIENT)
Dept: GENERAL RADIOLOGY | Age: 33
Discharge: HOME | End: 2023-06-01
Payer: COMMERCIAL

## 2023-05-30 DIAGNOSIS — R92.8 ABNORMAL MAMMOGRAM: ICD-10-CM

## 2023-05-30 PROCEDURE — 2709999900 US BREAST BIOPSY W LOC DEVICE 1ST LESION LEFT

## 2023-05-30 PROCEDURE — 2500000003 HC RX 250 WO HCPCS

## 2023-05-30 PROCEDURE — 88305 TISSUE EXAM BY PATHOLOGIST: CPT

## 2023-05-30 NOTE — PROGRESS NOTES
Met with patient prior to her breast biopsy. Instructed on ultrasound guided breast biopsy procedure. Instructed that results will be available in approximately 3-5 business days. She confirms that she has an active [employer] account and is agreeable to discussion of breast biopsy results by phone. Instructed that her physician will also be notified of results. Provided with folder containing my contact information and post biopsy discharge instructions. Instructed to call me if she has any questions or concerns about her biopsy. Verbalizes understanding.

## 2023-05-31 ENCOUNTER — INITIAL PRENATAL (OUTPATIENT)
Dept: OBGYN | Age: 33
End: 2023-05-31
Payer: COMMERCIAL

## 2023-05-31 VITALS
BODY MASS INDEX: 45.53 KG/M2 | DIASTOLIC BLOOD PRESSURE: 80 MMHG | WEIGHT: 257 LBS | SYSTOLIC BLOOD PRESSURE: 127 MMHG | HEART RATE: 88 BPM

## 2023-05-31 DIAGNOSIS — O09.92 SUPERVISION OF HIGH RISK PREGNANCY IN SECOND TRIMESTER: Primary | ICD-10-CM

## 2023-05-31 DIAGNOSIS — O09.92 SUPERVISION OF HIGH RISK PREGNANCY IN SECOND TRIMESTER: ICD-10-CM

## 2023-05-31 PROCEDURE — 0500F INITIAL PRENATAL CARE VISIT: CPT | Performed by: OBSTETRICS & GYNECOLOGY

## 2023-05-31 PROCEDURE — 99213 OFFICE O/P EST LOW 20 MIN: CPT | Performed by: OBSTETRICS & GYNECOLOGY

## 2023-05-31 NOTE — PROGRESS NOTES
Cedric Antonio reports she had an ultrasound done on  and [facility] [address] was 2023 she was pregnant with twins. They did not tell her how many weeks she was or whether it was mono or dichorionic. Beba Lemus She has not had an ultrasound yet in the [place]. FOB is still in [facility]. Recent   not complicated by HTN or GDM. Recent diagnosis of hypothyroidism. Social history negative for tobacco alcohol or drug use. Underwent a breast biopsy yesterday pathology is pending. Reviewed first trimester changes. MFM consult ordered with ultrasound for Chorionicity  Panorama ordered with initial prenatal labs  GC chlamydia ordered.

## 2023-05-31 NOTE — PROGRESS NOTES
Initial ob  +fm  Pt denies lof vag bleeding or contractions  Pt voiced having twins/[place].

## 2023-06-02 ENCOUNTER — TELEPHONE (OUTPATIENT)
Dept: GENERAL RADIOLOGY | Age: 33
End: 2023-06-02

## 2023-06-02 NOTE — TELEPHONE ENCOUNTER
Spoke with Marshal Cesar and her sister about her left breast biopsy findings of fibroadenoma and performing radiologist's recommendation for annual screening mammogram beginning at age 36. They verbalize understanding. Breast biopsy results routed to [facility] and faxed to [facility].  Electronically signed by Francesca Menendez RN, BSN on 6/2/2023 at 2:41 PM

## 2023-06-05 LAB
C TRACH DNA SPEC QL NAA+PROBE: NEGATIVE
N GONORRHOEA DNA SPEC QL NAA+PROBE: NEGATIVE
SPECIMEN SOURCE: NORMAL

## 2023-06-19 ENCOUNTER — INITIAL PRENATAL (OUTPATIENT)
Dept: OBGYN CLINIC | Age: 33
End: 2023-06-19
Payer: COMMERCIAL

## 2023-06-19 ENCOUNTER — ANCILLARY PROCEDURE (OUTPATIENT)
Dept: OBGYN CLINIC | Age: 33
End: 2023-06-19
Payer: COMMERCIAL

## 2023-06-19 VITALS
SYSTOLIC BLOOD PRESSURE: 115 MMHG | WEIGHT: 263.5 LBS | BODY MASS INDEX: 46.68 KG/M2 | HEART RATE: 85 BPM | DIASTOLIC BLOOD PRESSURE: 80 MMHG

## 2023-06-19 DIAGNOSIS — Z3A.18 18 WEEKS GESTATION OF PREGNANCY: Primary | ICD-10-CM

## 2023-06-19 LAB
GLUCOSE URINE, POC: NEGATIVE
PROTEIN UA: NEGATIVE

## 2023-06-19 PROCEDURE — 76811 OB US DETAILED SNGL FETUS: CPT | Performed by: OBSTETRICS & GYNECOLOGY

## 2023-06-19 PROCEDURE — 99203 OFFICE O/P NEW LOW 30 MIN: CPT | Performed by: OBSTETRICS & GYNECOLOGY

## 2023-06-19 PROCEDURE — 76812 OB US DETAILED ADDL FETUS: CPT | Performed by: OBSTETRICS & GYNECOLOGY

## 2023-06-19 PROCEDURE — 81002 URINALYSIS NONAUTO W/O SCOPE: CPT | Performed by: OBSTETRICS & GYNECOLOGY

## 2023-06-19 NOTE — PATIENT INSTRUCTIONS
Please arrive for your scheduled appointment at least 15 minutes early with your actual insurance card+ a photo ID. Also if you need any refills ordered or have questions, it may take up 48 hours to reply. Please allow ample time for your refills. Call me when you use last refill. Thank you for your cooperation. Call your primary obstetrician with bleeding, leaking of fluid, abdominal tenderness, headache, blurry vision, epigastric pain and increased urinary frequency. If you are experiencing an emergency and need immediate help, call 911 or go to go emergency room or labor and delivery. if you are sick, not feeling well or have an infectious process going on please reschedule your appointment by calling [phone]. Also if any family members are not feeling well, please do not bring them to your appointment. We appreciate your cooperation. We are doing this in order to protect our pregnant mothers+ their babies. if you are sick, not feeling well or have an infectious process going on please reschedule your appointment by calling [phone]. Also if any family members are not feeling well, please do not bring them to your appointment. We appreciate your cooperation. We are doing this in order to protect our pregnant mothers+ their babies.

## 2023-06-19 NOTE — PROGRESS NOTES
Pt here for new OB ultrasound of twins  Pt denies any bleeding/cramping  Pt feeling some movement  Pt c./o backache when she is holding her son

## 2023-06-20 ENCOUNTER — TELEPHONE (OUTPATIENT)
Dept: OBGYN | Age: 33
End: 2023-06-20

## 2023-06-20 DIAGNOSIS — O99.019 IRON DEFICIENCY ANEMIA DURING PREGNANCY: Primary | ICD-10-CM

## 2023-06-20 DIAGNOSIS — D50.9 IRON DEFICIENCY ANEMIA DURING PREGNANCY: Primary | ICD-10-CM

## 2023-06-20 RX ORDER — FERROUS SULFATE 325(65) MG
325 TABLET ORAL EVERY OTHER DAY
Qty: 60 TABLET | Refills: 5 | Status: SHIPPED | OUTPATIENT
Start: 2023-06-20

## 2023-06-20 RX ORDER — ASCORBIC ACID 500 MG
250 TABLET ORAL EVERY OTHER DAY
Qty: 30 TABLET | Refills: 3 | Status: SHIPPED | OUTPATIENT
Start: 2023-06-20

## 2023-06-22 NOTE — PROGRESS NOTES
[facility]  [address] [place] [address].  [address], [place]. [address]  Ph: [phone] Fax: [phone]       2023     RE: [address]  Dear Dr. Diane Stover: We saw  Ms. Howard   for consultation and ultrasound  in the [facility] office   on 2023      SUMMARY: REASSURING EXAM TODAY. PRECAUTIONS REVIEWED  FOLLOWUP YOUR OFFICE. NEXT MFM APPT    4  weeks   OB History 34yo  2. Para 0 @ 18w4d    Risk Factors Dichorionic - Diamniotic Twins   A)  Male; B) Female  Hypothyroid - 100mcg/D   Obesity  ASA 81mg/D   Declined NIPT  HBE4 pending -            ~Note - VSS- Afebrile - no complaints    Review of Systems :    CONSTITUTIONAL: No fever, no chills , no undue aches, pain    HEENT: No headache, no visual changes, no sore throat . No loss of smell, taste   PULM: No dyspnea, no cough   CARDIO:  No chest pains, no palpitations   GI: No N/V, no D/C, no diarrhea, no abdominal pain     : No dysuria, no vaginal bleeding or fluid leaking or discharge    She reports good early  fetal movements     PHYSICAL EXAMINATION: VSS - Afebrile  BMI  47    General Appearance: Healthy looking, alert , no acute distress.  Eyes: No pallor, no icterus, no photophobia.  Back: No CVA tenderness.  Abdomen: Soft, non-tender.  Extremities: Mild pretibial pitting edema    DSUA neg proteinuria, neg glucosuria today     The patient had a detailed ultrasound performed today which was reassuring . A detailed report is included in the EMR under the imaging tab from today's date. 1. Dichorionic-diamniotic twin gestation at 18w 4d with a clinical TERRI of 2023. Twin A:Active floating male @ 261 g. (o:9)   Twin B. Active transverse female @ 268 g. (o:9)   ~~~ Growth discordance 2.5%   2. Anatomic survey performed as noted above. Anatomy limited due to maternal body habitus and fetal position. 3. Amniotic fluid appeared normal for both twins.  Deepest pocket for

## 2023-06-26 ENCOUNTER — TELEPHONE (OUTPATIENT)
Dept: OBGYN | Age: 33
End: 2023-06-26

## 2023-06-26 ENCOUNTER — TELEPHONE (OUTPATIENT)
Dept: OBGYN CLINIC | Age: 33
End: 2023-06-26

## 2023-06-26 LAB
Lab: ABNORMAL
NTRA 22Q11.2 DELETION SYNDROME POPULATION-BASED RISK TEXT: ABNORMAL
NTRA 22Q11.2 DELETION SYNDROME RESULT TEXT: ABNORMAL
NTRA 22Q11.2 DELETION SYNDROME RISK SCORE TEXT: ABNORMAL
NTRA FETAL FRACTION: ABNORMAL
NTRA GENDER OF FETUS: ABNORMAL
NTRA MONOSOMY X AGE-BASED RISK TEXT: ABNORMAL
NTRA MONOSOMY X RESULT TEXT: ABNORMAL
NTRA MONOSOMY X RISK SCORE TEXT: ABNORMAL
NTRA TRIPLOIDY RESULT TEXT: ABNORMAL
NTRA TRISOMY 13 AGE-BASED RISK TEXT: ABNORMAL
NTRA TRISOMY 13 RESULT TEXT: ABNORMAL
NTRA TRISOMY 13 RISK SCORE TEXT: ABNORMAL
NTRA TRISOMY 18 AGE-BASED RISK TEXT: ABNORMAL
NTRA TRISOMY 18 RESULT TEXT: ABNORMAL
NTRA TRISOMY 18 RISK SCORE TEXT: ABNORMAL
NTRA TRISOMY 21 AGE-BASED RISK TEXT: ABNORMAL
NTRA TRISOMY 21 RESULT TEXT: ABNORMAL
NTRA TRISOMY 21 RISK SCORE TEXT: ABNORMAL

## 2023-06-26 NOTE — TELEPHONE ENCOUNTER
Called to tell patient that her refills were called in and she wanted to discuss the panorama results with you, the tripoldy. Has appointment tomorrow.

## 2023-06-27 ENCOUNTER — ROUTINE PRENATAL (OUTPATIENT)
Dept: OBGYN | Age: 33
End: 2023-06-27
Payer: COMMERCIAL

## 2023-06-27 VITALS
WEIGHT: 265 LBS | DIASTOLIC BLOOD PRESSURE: 81 MMHG | BODY MASS INDEX: 46.94 KG/M2 | HEART RATE: 77 BPM | SYSTOLIC BLOOD PRESSURE: 125 MMHG

## 2023-06-27 DIAGNOSIS — O09.92 PREGNANCY, SUPERVISION FOR, HIGH-RISK, SECOND TRIMESTER: Primary | ICD-10-CM

## 2023-06-27 LAB
GLUCOSE URINE, POC: NORMAL
PROTEIN UA: NEGATIVE

## 2023-06-27 PROCEDURE — 81002 URINALYSIS NONAUTO W/O SCOPE: CPT | Performed by: OBSTETRICS & GYNECOLOGY

## 2023-06-27 PROCEDURE — 99213 OFFICE O/P EST LOW 20 MIN: CPT | Performed by: OBSTETRICS & GYNECOLOGY

## 2023-06-27 PROCEDURE — G8427 DOCREV CUR MEDS BY ELIG CLIN: HCPCS | Performed by: OBSTETRICS & GYNECOLOGY

## 2023-06-27 PROCEDURE — G8417 CALC BMI ABV UP PARAM F/U: HCPCS | Performed by: OBSTETRICS & GYNECOLOGY

## 2023-06-27 PROCEDURE — 1036F TOBACCO NON-USER: CPT | Performed by: OBSTETRICS & GYNECOLOGY

## 2023-06-27 RX ORDER — LEVOTHYROXINE SODIUM 112 UG/1
112 TABLET ORAL DAILY
Qty: 30 TABLET | Refills: 5 | Status: SHIPPED | OUTPATIENT
Start: 2023-06-27

## 2023-06-27 RX ORDER — VITAMIN A ACETATE, BETA CAROTENE, ASCORBIC ACID, CHOLECALCIFEROL, .ALPHA.-TOCOPHEROL ACETATE, DL-, THIAMINE MONONITRATE, RIBOFLAVIN, NIACINAMIDE, PYRIDOXINE HYDROCHLORIDE, FOLIC ACID, CYANOCOBALAMIN, CALCIUM CARBONATE, FERROUS FUMARATE, ZINC OXIDE, CUPRIC OXIDE 3080; 12; 120; 400; 1; 1.84; 3; 20; 22; 920; 25; 200; 27; 10; 2 [IU]/1; UG/1; MG/1; [IU]/1; MG/1; MG/1; MG/1; MG/1; MG/1; [IU]/1; MG/1; MG/1; MG/1; MG/1; MG/1
1 TABLET, FILM COATED ORAL DAILY
Qty: 90 TABLET | Refills: 3 | Status: SHIPPED | OUTPATIENT
Start: 2023-06-27 | End: 2023-09-25

## 2023-07-20 ENCOUNTER — ANCILLARY PROCEDURE (OUTPATIENT)
Dept: OBGYN CLINIC | Age: 33
End: 2023-07-20
Payer: COMMERCIAL

## 2023-07-20 ENCOUNTER — ROUTINE PRENATAL (OUTPATIENT)
Dept: OBGYN CLINIC | Age: 33
End: 2023-07-20
Payer: COMMERCIAL

## 2023-07-20 VITALS
BODY MASS INDEX: 47.12 KG/M2 | SYSTOLIC BLOOD PRESSURE: 127 MMHG | WEIGHT: 266 LBS | HEART RATE: 82 BPM | DIASTOLIC BLOOD PRESSURE: 84 MMHG

## 2023-07-20 DIAGNOSIS — Z3A.23 23 WEEKS GESTATION OF PREGNANCY: ICD-10-CM

## 2023-07-20 DIAGNOSIS — Z34.03 PRIMIGRAVIDA IN THIRD TRIMESTER: Primary | ICD-10-CM

## 2023-07-20 LAB
GLUCOSE URINE, POC: NEGATIVE
PROTEIN UA: NEGATIVE

## 2023-07-20 PROCEDURE — 81002 URINALYSIS NONAUTO W/O SCOPE: CPT | Performed by: OBSTETRICS & GYNECOLOGY

## 2023-07-20 PROCEDURE — 76816 OB US FOLLOW-UP PER FETUS: CPT | Performed by: OBSTETRICS & GYNECOLOGY

## 2023-07-20 PROCEDURE — 99213 OFFICE O/P EST LOW 20 MIN: CPT | Performed by: OBSTETRICS & GYNECOLOGY

## 2023-07-20 NOTE — PATIENT INSTRUCTIONS

## 2023-07-20 NOTE — PROGRESS NOTES
Pt here for f/u  Denies cramping/bleeding//lof. She has stiffness at night when sleeping in bilat legs.   Pt stopped prenatal vitamins because they upset her stomach  Good fetal movement

## 2023-07-25 ENCOUNTER — ROUTINE PRENATAL (OUTPATIENT)
Dept: OBGYN | Age: 33
End: 2023-07-25
Payer: COMMERCIAL

## 2023-07-25 VITALS
HEART RATE: 78 BPM | SYSTOLIC BLOOD PRESSURE: 137 MMHG | BODY MASS INDEX: 47.46 KG/M2 | WEIGHT: 267.9 LBS | DIASTOLIC BLOOD PRESSURE: 87 MMHG

## 2023-07-25 DIAGNOSIS — O09.92 SUPERVISION OF HIGH RISK PREGNANCY IN SECOND TRIMESTER: Primary | ICD-10-CM

## 2023-07-25 DIAGNOSIS — O99.282 HYPOTHYROID IN PREGNANCY, ANTEPARTUM, SECOND TRIMESTER: ICD-10-CM

## 2023-07-25 DIAGNOSIS — E03.9 HYPOTHYROID IN PREGNANCY, ANTEPARTUM, SECOND TRIMESTER: ICD-10-CM

## 2023-07-25 PROCEDURE — 81002 URINALYSIS NONAUTO W/O SCOPE: CPT | Performed by: OBSTETRICS & GYNECOLOGY

## 2023-07-25 PROCEDURE — 99213 OFFICE O/P EST LOW 20 MIN: CPT | Performed by: OBSTETRICS & GYNECOLOGY

## 2023-07-25 NOTE — PROGRESS NOTES
Positive fetal movement no BLD, LOF. Reports vulvar swelling. Irritated lump on mons noted to be a follicle no signs of infection bilateral slight labial swelling. Continue follow with [facility] 2628-week labs with TSH given.

## 2023-07-25 NOTE — PROGRESS NOTES
Routine ob  +fm  Pt denies lof vag bleeding or contractions  Urine dip protein/sugar  neg/neg  Pt stated had lump in vaginal area a lump under the skin

## 2023-07-26 RX ORDER — ASPIRIN 81 MG/1
81 TABLET ORAL DAILY
Qty: 90 TABLET | Refills: 1 | Status: SHIPPED | OUTPATIENT
Start: 2023-07-26

## 2023-07-26 RX ORDER — FOLIC ACID 1 MG/1
1 TABLET ORAL DAILY
Qty: 90 TABLET | Refills: 3 | Status: SHIPPED | OUTPATIENT
Start: 2023-07-26

## 2023-08-02 ENCOUNTER — HOSPITAL ENCOUNTER (OUTPATIENT)
Age: 33
Discharge: HOME | End: 2023-08-02
Payer: COMMERCIAL

## 2023-08-02 DIAGNOSIS — O99.282 HYPOTHYROID IN PREGNANCY, ANTEPARTUM, SECOND TRIMESTER: ICD-10-CM

## 2023-08-02 DIAGNOSIS — E03.9 HYPOTHYROID IN PREGNANCY, ANTEPARTUM, SECOND TRIMESTER: ICD-10-CM

## 2023-08-02 DIAGNOSIS — E03.9 HYPOTHYROIDISM, UNSPECIFIED TYPE: ICD-10-CM

## 2023-08-02 DIAGNOSIS — O09.92 SUPERVISION OF HIGH RISK PREGNANCY IN SECOND TRIMESTER: ICD-10-CM

## 2023-08-02 LAB
ERYTHROCYTE [DISTWIDTH] IN BLOOD BY AUTOMATED COUNT: 13.2 % (ref 11.5–15)
FERRITIN SERPL-MCNC: 12 NG/ML
HCT VFR BLD AUTO: 33 % (ref 34–48)
HGB BLD-MCNC: 11.1 G/DL (ref 11.5–15.5)
MCH RBC QN AUTO: 28.6 PG (ref 26–35)
MCHC RBC AUTO-ENTMCNC: 33.6 G/DL (ref 32–34.5)
MCV RBC AUTO: 85.1 FL (ref 80–99.9)
PLATELET # BLD AUTO: 210 K/UL (ref 130–450)
PMV BLD AUTO: 10.2 FL (ref 7–12)
RBC # BLD AUTO: 3.88 M/UL (ref 3.5–5.5)
T4 FREE SERPL-MCNC: 1.4 NG/DL (ref 0.9–1.7)
TSH SERPL DL<=0.05 MIU/L-ACNC: 2.59 UIU/ML (ref 0.27–4.2)
WBC OTHER # BLD: 9.3 K/UL (ref 4.5–11.5)

## 2023-08-02 PROCEDURE — 86592 SYPHILIS TEST NON-TREP QUAL: CPT

## 2023-08-02 PROCEDURE — 82951 GLUCOSE TOLERANCE TEST (GTT): CPT

## 2023-08-02 PROCEDURE — 84439 ASSAY OF FREE THYROXINE: CPT

## 2023-08-02 PROCEDURE — 36415 COLL VENOUS BLD VENIPUNCTURE: CPT

## 2023-08-02 PROCEDURE — 84443 ASSAY THYROID STIM HORMONE: CPT

## 2023-08-02 PROCEDURE — 85027 COMPLETE CBC AUTOMATED: CPT

## 2023-08-02 PROCEDURE — 82728 ASSAY OF FERRITIN: CPT

## 2023-08-03 LAB
AMOUNT GLUCOSE GIVEN: 75 G
COLLECT TIME, 1HR GLUCOSE: NORMAL
COLLECT TIME, 2HR GLUCOSE: NORMAL
COLLECT TIME, FASTING GLUCOSE: NORMAL
GLUCOSE 2H P 100 G GLC PO SERPL-MCNC: 76 MG/DL (ref 74–99)
GLUCOSE 3H P 100 G GLC PO SERPL-MCNC: 130 MG/DL
GLUCOSE TOLERANCE TEST 2 HOUR: 130 MG/DL
RPR SER QL: NONREACTIVE

## 2023-08-14 ENCOUNTER — ROUTINE PRENATAL (OUTPATIENT)
Dept: OBGYN CLINIC | Age: 33
End: 2023-08-14
Payer: COMMERCIAL

## 2023-08-14 ENCOUNTER — ANCILLARY PROCEDURE (OUTPATIENT)
Dept: OBGYN CLINIC | Age: 33
End: 2023-08-14
Payer: COMMERCIAL

## 2023-08-14 VITALS
DIASTOLIC BLOOD PRESSURE: 73 MMHG | SYSTOLIC BLOOD PRESSURE: 109 MMHG | WEIGHT: 273.4 LBS | HEART RATE: 110 BPM | BODY MASS INDEX: 48.43 KG/M2

## 2023-08-14 DIAGNOSIS — O09.92 SUPERVISION OF HIGH RISK PREGNANCY IN SECOND TRIMESTER: Primary | ICD-10-CM

## 2023-08-14 LAB
GLUCOSE URINE, POC: NEGATIVE
PROTEIN UA: NEGATIVE

## 2023-08-14 PROCEDURE — 1036F TOBACCO NON-USER: CPT | Performed by: OBSTETRICS & GYNECOLOGY

## 2023-08-14 PROCEDURE — G8427 DOCREV CUR MEDS BY ELIG CLIN: HCPCS | Performed by: OBSTETRICS & GYNECOLOGY

## 2023-08-14 PROCEDURE — 99213 OFFICE O/P EST LOW 20 MIN: CPT | Performed by: OBSTETRICS & GYNECOLOGY

## 2023-08-14 PROCEDURE — 76816 OB US FOLLOW-UP PER FETUS: CPT | Performed by: OBSTETRICS & GYNECOLOGY

## 2023-08-14 PROCEDURE — G8417 CALC BMI ABV UP PARAM F/U: HCPCS | Performed by: OBSTETRICS & GYNECOLOGY

## 2023-08-14 PROCEDURE — 81002 URINALYSIS NONAUTO W/O SCOPE: CPT | Performed by: OBSTETRICS & GYNECOLOGY

## 2023-08-15 ENCOUNTER — ROUTINE PRENATAL (OUTPATIENT)
Dept: OBGYN | Age: 33
End: 2023-08-15
Payer: COMMERCIAL

## 2023-08-15 VITALS
SYSTOLIC BLOOD PRESSURE: 118 MMHG | WEIGHT: 269.8 LBS | HEART RATE: 75 BPM | BODY MASS INDEX: 47.79 KG/M2 | DIASTOLIC BLOOD PRESSURE: 78 MMHG

## 2023-08-15 DIAGNOSIS — O09.93 SUPERVISION OF HIGH RISK PREGNANCY IN THIRD TRIMESTER: Primary | ICD-10-CM

## 2023-08-15 LAB
GLUCOSE URINE, POC: NEGATIVE
PROTEIN UA: NEGATIVE

## 2023-08-15 PROCEDURE — 81002 URINALYSIS NONAUTO W/O SCOPE: CPT | Performed by: OBSTETRICS & GYNECOLOGY

## 2023-08-15 PROCEDURE — 99213 OFFICE O/P EST LOW 20 MIN: CPT | Performed by: OBSTETRICS & GYNECOLOGY

## 2023-08-15 PROCEDURE — 1036F TOBACCO NON-USER: CPT | Performed by: OBSTETRICS & GYNECOLOGY

## 2023-08-15 PROCEDURE — G8427 DOCREV CUR MEDS BY ELIG CLIN: HCPCS | Performed by: OBSTETRICS & GYNECOLOGY

## 2023-08-15 PROCEDURE — G8417 CALC BMI ABV UP PARAM F/U: HCPCS | Performed by: OBSTETRICS & GYNECOLOGY

## 2023-08-15 RX ORDER — FERROUS SULFATE 325(65) MG
1 TABLET ORAL EVERY OTHER DAY
COMMUNITY
Start: 2023-07-20

## 2023-08-15 NOTE — PROGRESS NOTES
Good FM. No BLD or LOF. Emotionally doing well and eating okay. Reviewed 26/28-week labs is taking iron q. OD. We briefly reviewed vaginal delivery versus .

## 2023-08-15 NOTE — PROGRESS NOTES
Patient alert and pleasant with no complaints. Here today for prenatal visit. Patient having twins. Urine for glucose and protein obtained with negative results. Discharge instructions have been discussed with the patient. Patient advised to call our office with any questions or concerns. Voiced understanding.

## 2023-08-18 ENCOUNTER — OFFICE VISIT (OUTPATIENT)
Dept: INTERNAL MEDICINE | Age: 33
End: 2023-08-18
Payer: COMMERCIAL

## 2023-08-18 VITALS
BODY MASS INDEX: 47.7 KG/M2 | TEMPERATURE: 97.1 F | WEIGHT: 269.2 LBS | SYSTOLIC BLOOD PRESSURE: 123 MMHG | HEIGHT: 63 IN | OXYGEN SATURATION: 98 % | DIASTOLIC BLOOD PRESSURE: 77 MMHG | RESPIRATION RATE: 18 BRPM | HEART RATE: 93 BPM

## 2023-08-18 DIAGNOSIS — N63.10 MASSES OF BOTH BREASTS: ICD-10-CM

## 2023-08-18 DIAGNOSIS — E03.9 HYPOTHYROIDISM, UNSPECIFIED TYPE: ICD-10-CM

## 2023-08-18 DIAGNOSIS — N63.20 MASSES OF BOTH BREASTS: ICD-10-CM

## 2023-08-18 PROCEDURE — G8417 CALC BMI ABV UP PARAM F/U: HCPCS | Performed by: INTERNAL MEDICINE

## 2023-08-18 PROCEDURE — G8427 DOCREV CUR MEDS BY ELIG CLIN: HCPCS | Performed by: INTERNAL MEDICINE

## 2023-08-18 PROCEDURE — 1036F TOBACCO NON-USER: CPT | Performed by: INTERNAL MEDICINE

## 2023-08-18 PROCEDURE — 99212 OFFICE O/P EST SF 10 MIN: CPT

## 2023-08-18 NOTE — PATIENT INSTRUCTIONS
Dear Elisabeth Walton,    Thank you for coming to your appointment today. I hope we have addressed all of your needs. Please make sure to do the following:  - Continue your medications as listed. - We will see each other again in 5 months    Call for a sooner appointment if you develop any issues    Have a great day!     Sincerely,  Belia Osgood, M.D  8/18/2023  3:39 PM

## 2023-08-18 NOTE — PROGRESS NOTES
[facility]  Internal Medicine Residency Clinic    Attending Physician Statement  I have discussed the case, including pertinent history and exam findings with the resident physician. I agree with the assessment, plan and orders as documented by the resident. I have reviewed the relevant PMHx, PSHx, FamHx, SocialHx, medications, and allergies and updated history as appropriate. Patient presents for routine follow up of medical problems. Continuing to receive prenatal care with follow-up with OB and MFM- continue follow-up and recommendations   Continue follow-up recs per MFM/OB on 9/6   Prior Breast mass work-up including bx was noted to be fibroadenoma in nature  Recommending screening mammograms as appropriate per guidelines in future   Continue follow-up at this time for ongoing prenatal care needs. Remainder of medical problems as per resident note.     David Ware MD  8/18/2023 3:35 PM

## 2023-08-18 NOTE — PROGRESS NOTES
[address] [facility]  Internal Medicine Residency Program  [facility] Note      CC: had concerns including 3 Month Follow-Up (Patient states no concerns ). Brayden Lucas has a PMHx of Hypothyroidism, Left Breast Lump (fibroadenoma), Chronic Knee pain bilaterally, Vitamin D deficiency  presented to the [facility] for a routine visit     Pregnant with twins, following with ObGyn and . Expected earliest Oct 16th. Previous pregnancy with boy about 11 months now. Bilateral breast mass  Fibroadenoma previously seen in on mammogram in [place] but got pregnant at this time and never had it repeated. Now mass persists. Breast US 5/2023   left breast 11 o'clock 10 cm from the nipple, there is an irregular,  hypoechoic mass with angular margins which measures 1.4 x 1.2 x 1.3 cm. There are scattered bilateral oval, hypoechoic masses with circumscribed  margins of varying size. The largest on the left in the retroareolar region  measures 1.3 x 0.7 x 1.2 cm. The largest on the right at 9 o'clock 7 cm from  the nipple measures 8 x 6 x 10 mm. The oval masses are most suggestive of  fibroadenomas. Biopsy of irregular left breast mass at 11 o'clock confirmed fibroadenoma  Recommend annual b/l screening mammogram at age 36 with possible annual bilateral breast MRI given TC score of 21.8%    Health Care Maintenance: Things to follow up:      Recent visits/labs:      Labs and Imaging:       SUBJECTIVE:    ROS:  Constitutional:  Negative for appetite change, chills, fatigue, fever and unexpected weight change. HENT:  Negative for sneezing and sore throat. Eyes:  Negative for discharge and redness. Respiratory:  Negative for cough, chest tightness, shortness of breath and wheezing. Cardiovascular:  Negative for chest pain, palpitations and leg swelling. Gastrointestinal:  Negative for N/V/C/D, abdominal distention, abdominal pain.    Genitourinary:  Negative for dysuria and increased

## 2023-09-06 ENCOUNTER — ANCILLARY PROCEDURE (OUTPATIENT)
Dept: OBGYN CLINIC | Age: 33
End: 2023-09-06
Payer: COMMERCIAL

## 2023-09-06 ENCOUNTER — ROUTINE PRENATAL (OUTPATIENT)
Dept: OBGYN | Age: 33
End: 2023-09-06
Payer: COMMERCIAL

## 2023-09-06 ENCOUNTER — ROUTINE PRENATAL (OUTPATIENT)
Dept: OBGYN CLINIC | Age: 33
End: 2023-09-06
Payer: COMMERCIAL

## 2023-09-06 VITALS
DIASTOLIC BLOOD PRESSURE: 80 MMHG | SYSTOLIC BLOOD PRESSURE: 121 MMHG | WEIGHT: 273 LBS | BODY MASS INDEX: 48.36 KG/M2 | HEART RATE: 77 BPM

## 2023-09-06 VITALS
WEIGHT: 272.38 LBS | SYSTOLIC BLOOD PRESSURE: 115 MMHG | HEART RATE: 69 BPM | DIASTOLIC BLOOD PRESSURE: 74 MMHG | BODY MASS INDEX: 48.25 KG/M2

## 2023-09-06 DIAGNOSIS — O09.93 SUPERVISION OF HIGH RISK PREGNANCY IN THIRD TRIMESTER: Primary | ICD-10-CM

## 2023-09-06 DIAGNOSIS — Z3A.29 29 WEEKS GESTATION OF PREGNANCY: ICD-10-CM

## 2023-09-06 DIAGNOSIS — Z3A.29 29 WEEKS GESTATION OF PREGNANCY: Primary | ICD-10-CM

## 2023-09-06 LAB
GLUCOSE URINE, POC: NEGATIVE
PROTEIN UA: NEGATIVE

## 2023-09-06 PROCEDURE — 99213 OFFICE O/P EST LOW 20 MIN: CPT | Performed by: OBSTETRICS & GYNECOLOGY

## 2023-09-06 PROCEDURE — G8417 CALC BMI ABV UP PARAM F/U: HCPCS | Performed by: OBSTETRICS & GYNECOLOGY

## 2023-09-06 PROCEDURE — 90715 TDAP VACCINE 7 YRS/> IM: CPT

## 2023-09-06 PROCEDURE — 1036F TOBACCO NON-USER: CPT | Performed by: OBSTETRICS & GYNECOLOGY

## 2023-09-06 PROCEDURE — 81002 URINALYSIS NONAUTO W/O SCOPE: CPT | Performed by: OBSTETRICS & GYNECOLOGY

## 2023-09-06 PROCEDURE — 76820 UMBILICAL ARTERY ECHO: CPT | Performed by: OBSTETRICS & GYNECOLOGY

## 2023-09-06 PROCEDURE — 76821 MIDDLE CEREBRAL ARTERY ECHO: CPT | Performed by: OBSTETRICS & GYNECOLOGY

## 2023-09-06 PROCEDURE — G8427 DOCREV CUR MEDS BY ELIG CLIN: HCPCS | Performed by: OBSTETRICS & GYNECOLOGY

## 2023-09-06 PROCEDURE — 76819 FETAL BIOPHYS PROFIL W/O NST: CPT | Performed by: OBSTETRICS & GYNECOLOGY

## 2023-09-06 PROCEDURE — 76816 OB US FOLLOW-UP PER FETUS: CPT | Performed by: OBSTETRICS & GYNECOLOGY

## 2023-09-06 PROCEDURE — 99999 PR OFFICE/OUTPT VISIT,PROCEDURE ONLY: CPT | Performed by: OBSTETRICS & GYNECOLOGY

## 2023-09-06 NOTE — PROGRESS NOTES
Pt here for follow up ultrasound of twins  Pt denies any bleeding/has occasional contractions/no lof  Pt states good fetal movement of twins

## 2023-09-06 NOTE — PROGRESS NOTES
Pt alert and pleasant here today for routine prenatal visit. She is complaining of a headache since yesterday. She denies any sensitivity to light. She has no other concerns. Discharge instructions have been discussed with the patient. Patient advised to call our office with any questions or concerns. Voiced understanding.

## 2023-09-06 NOTE — PROGRESS NOTES
Good FM. No BLD or LOF. Occasional CTN's. Reports headache but eating very minimal.  Meets with MFM today. Tdap given today encouraged small frequent meals.

## 2023-09-19 ENCOUNTER — ANCILLARY PROCEDURE (OUTPATIENT)
Dept: OBGYN CLINIC | Age: 33
End: 2023-09-19
Payer: COMMERCIAL

## 2023-09-19 ENCOUNTER — ROUTINE PRENATAL (OUTPATIENT)
Dept: OBGYN CLINIC | Age: 33
End: 2023-09-19
Payer: COMMERCIAL

## 2023-09-19 ENCOUNTER — ROUTINE PRENATAL (OUTPATIENT)
Dept: OBGYN | Age: 33
End: 2023-09-19
Payer: COMMERCIAL

## 2023-09-19 VITALS
DIASTOLIC BLOOD PRESSURE: 84 MMHG | SYSTOLIC BLOOD PRESSURE: 134 MMHG | WEIGHT: 273.25 LBS | HEART RATE: 70 BPM | BODY MASS INDEX: 48.4 KG/M2

## 2023-09-19 VITALS
SYSTOLIC BLOOD PRESSURE: 111 MMHG | BODY MASS INDEX: 48.4 KG/M2 | DIASTOLIC BLOOD PRESSURE: 77 MMHG | HEART RATE: 72 BPM | WEIGHT: 273.25 LBS

## 2023-09-19 DIAGNOSIS — O09.90 HIGH RISK PREGNANCY, ANTEPARTUM: Primary | ICD-10-CM

## 2023-09-19 DIAGNOSIS — O30.043 DICHORIONIC DIAMNIOTIC TWIN PREGNANCY IN THIRD TRIMESTER: ICD-10-CM

## 2023-09-19 DIAGNOSIS — Z3A.31 31 WEEKS GESTATION OF PREGNANCY: Primary | ICD-10-CM

## 2023-09-19 LAB
GLUCOSE URINE, POC: NEGATIVE
PROTEIN UA: NEGATIVE

## 2023-09-19 PROCEDURE — 99213 OFFICE O/P EST LOW 20 MIN: CPT | Performed by: OBSTETRICS & GYNECOLOGY

## 2023-09-19 PROCEDURE — 90688 IIV4 VACCINE SPLT 0.5 ML IM: CPT

## 2023-09-19 PROCEDURE — 76820 UMBILICAL ARTERY ECHO: CPT | Performed by: OBSTETRICS & GYNECOLOGY

## 2023-09-19 PROCEDURE — G8427 DOCREV CUR MEDS BY ELIG CLIN: HCPCS | Performed by: OBSTETRICS & GYNECOLOGY

## 2023-09-19 PROCEDURE — 81002 URINALYSIS NONAUTO W/O SCOPE: CPT | Performed by: OBSTETRICS & GYNECOLOGY

## 2023-09-19 PROCEDURE — 1036F TOBACCO NON-USER: CPT | Performed by: OBSTETRICS & GYNECOLOGY

## 2023-09-19 PROCEDURE — 76815 OB US LIMITED FETUS(S): CPT | Performed by: OBSTETRICS & GYNECOLOGY

## 2023-09-19 PROCEDURE — G8417 CALC BMI ABV UP PARAM F/U: HCPCS | Performed by: OBSTETRICS & GYNECOLOGY

## 2023-09-19 PROCEDURE — 76818 FETAL BIOPHYS PROFILE W/NST: CPT | Performed by: OBSTETRICS & GYNECOLOGY

## 2023-09-19 NOTE — PROGRESS NOTES
Good FM. No BLD or LOF. No SS PIH. Emotionally doing well. Occasionally gets lightheaded. Seeing MFM. Had questions regarding delivery mode.

## 2023-09-19 NOTE — PATIENT INSTRUCTIONS
Please arrive for your scheduled appointment at least 15 minutes early with your actual insurance card+ a photo ID. Also if you need any refills ordered or have questions, it may take up 48 hours to reply. Please allow ample time for your refills. Call me when you use last refill. Thank you for your cooperation. Call your primary obstetrician with bleeding, leaking of fluid, abdominal tenderness, headache, blurry vision, epigastric pain and increased urinary frequency. If you are experiencing an emergency and need immediate help, call 911 or go to go emergency room or labor and delivery. Do kick counts after dinner. Call your primary obstetrician if less than 10 kicks in 2 hours after dinner. Call your primary obstetrician with bleeding, leaking of fluid, abdominal tenderness, headache, blurry vision, epigastric pain and increased urinary frequency. if you are sick, not feeling well or have an infectious process going on please reschedule your appointment by calling [phone]. Also if any family members are not feeling well, please do not bring them to your appointment. We appreciate your cooperation. We are doing this in order to protect our pregnant mothers+ their babies. if you are sick, not feeling well or have an infectious process going on please reschedule your appointment by calling [phone]. Also if any family members are not feeling well, please do not bring them to your appointment. We appreciate your cooperation. We are doing this in order to protect our pregnant mothers+ their babies.

## 2023-09-19 NOTE — PROGRESS NOTES
Pt here for 2 week follow up of twins  Pt denies any bleeding but has some cramping at times  Pt states good fetal movement

## 2023-09-19 NOTE — PROGRESS NOTES
Pt alert and pleasant here today for routine prenatal visit. She was seen at [facility] prior to appointment. She has no concerns. Denies bleeding/cramping/lof. Good fetal movement. Discharge instructions have been discussed with the patient. Patient advised to call our office with any questions or concerns. Voiced understanding.

## 2023-10-03 ENCOUNTER — ROUTINE PRENATAL (OUTPATIENT)
Dept: OBGYN CLINIC | Age: 33
End: 2023-10-03
Payer: COMMERCIAL

## 2023-10-03 ENCOUNTER — ANCILLARY PROCEDURE (OUTPATIENT)
Dept: OBGYN CLINIC | Age: 33
End: 2023-10-03
Payer: COMMERCIAL

## 2023-10-03 ENCOUNTER — ROUTINE PRENATAL (OUTPATIENT)
Dept: OBGYN | Age: 33
End: 2023-10-03
Payer: COMMERCIAL

## 2023-10-03 VITALS
WEIGHT: 280.2 LBS | BODY MASS INDEX: 49.64 KG/M2 | HEART RATE: 73 BPM | DIASTOLIC BLOOD PRESSURE: 86 MMHG | SYSTOLIC BLOOD PRESSURE: 133 MMHG

## 2023-10-03 VITALS
BODY MASS INDEX: 49.6 KG/M2 | DIASTOLIC BLOOD PRESSURE: 79 MMHG | WEIGHT: 280 LBS | SYSTOLIC BLOOD PRESSURE: 120 MMHG | HEART RATE: 72 BPM

## 2023-10-03 DIAGNOSIS — O09.90 HIGH RISK PREGNANCY, ANTEPARTUM: Primary | ICD-10-CM

## 2023-10-03 DIAGNOSIS — Z3A.33 33 WEEKS GESTATION OF PREGNANCY: ICD-10-CM

## 2023-10-03 DIAGNOSIS — O30.043 DICHORIONIC DIAMNIOTIC TWIN PREGNANCY IN THIRD TRIMESTER: Primary | ICD-10-CM

## 2023-10-03 LAB
GLUCOSE URINE, POC: NEGATIVE
PROTEIN UA: NEGATIVE

## 2023-10-03 PROCEDURE — G8417 CALC BMI ABV UP PARAM F/U: HCPCS | Performed by: OBSTETRICS & GYNECOLOGY

## 2023-10-03 PROCEDURE — 99213 OFFICE O/P EST LOW 20 MIN: CPT

## 2023-10-03 PROCEDURE — 99213 OFFICE O/P EST LOW 20 MIN: CPT | Performed by: OBSTETRICS & GYNECOLOGY

## 2023-10-03 PROCEDURE — 1036F TOBACCO NON-USER: CPT | Performed by: OBSTETRICS & GYNECOLOGY

## 2023-10-03 PROCEDURE — 81002 URINALYSIS NONAUTO W/O SCOPE: CPT | Performed by: OBSTETRICS & GYNECOLOGY

## 2023-10-03 PROCEDURE — G8427 DOCREV CUR MEDS BY ELIG CLIN: HCPCS | Performed by: OBSTETRICS & GYNECOLOGY

## 2023-10-03 PROCEDURE — 76820 UMBILICAL ARTERY ECHO: CPT | Performed by: OBSTETRICS & GYNECOLOGY

## 2023-10-03 PROCEDURE — 76816 OB US FOLLOW-UP PER FETUS: CPT | Performed by: OBSTETRICS & GYNECOLOGY

## 2023-10-03 PROCEDURE — 76818 FETAL BIOPHYS PROFILE W/NST: CPT | Performed by: OBSTETRICS & GYNECOLOGY

## 2023-10-03 PROCEDURE — 99999 PR OFFICE/OUTPT VISIT,PROCEDURE ONLY: CPT | Performed by: OBSTETRICS & GYNECOLOGY

## 2023-10-03 PROCEDURE — G8482 FLU IMMUNIZE ORDER/ADMIN: HCPCS | Performed by: OBSTETRICS & GYNECOLOGY

## 2023-10-03 PROCEDURE — 76821 MIDDLE CEREBRAL ARTERY ECHO: CPT | Performed by: OBSTETRICS & GYNECOLOGY

## 2023-10-03 NOTE — PROGRESS NOTES
Reports good FM. No BLD LOF or CTN's. No SS PIH. Saw MFM today. Has weekly NST BPP. Reviewed warning signs and when to call.   Has her citizenship appointment in 2 weeks questions whether she will be delivered or not we reviewed there is no way to know she may need a note

## 2023-10-03 NOTE — PROGRESS NOTES
Pt here for bpp/nst of twins  Pt states good fetal movement of twins  Pt denies any bleeding/cramping/lof

## 2023-10-03 NOTE — PROGRESS NOTES
Patient was seen by MFM today (they did US and NST today)  Patient alert and pleasant with no complaints. Here today for routine prenatal visit. Fetal heart tones assessed without difficulty  Discharge instructions have been discussed with the patient. Patient agrees to call the office with any questions or concerns. Patient verbalized understanding.

## 2023-10-03 NOTE — PATIENT INSTRUCTIONS

## 2023-10-11 ENCOUNTER — ROUTINE PRENATAL (OUTPATIENT)
Dept: OBGYN CLINIC | Age: 33
End: 2023-10-11
Payer: COMMERCIAL

## 2023-10-11 ENCOUNTER — ANCILLARY PROCEDURE (OUTPATIENT)
Dept: OBGYN CLINIC | Age: 33
End: 2023-10-11
Payer: COMMERCIAL

## 2023-10-11 VITALS
WEIGHT: 277.5 LBS | DIASTOLIC BLOOD PRESSURE: 76 MMHG | HEART RATE: 73 BPM | BODY MASS INDEX: 49.16 KG/M2 | SYSTOLIC BLOOD PRESSURE: 130 MMHG

## 2023-10-11 DIAGNOSIS — O30.043 DICHORIONIC DIAMNIOTIC TWIN PREGNANCY IN THIRD TRIMESTER: Primary | ICD-10-CM

## 2023-10-11 DIAGNOSIS — Z3A.34 34 WEEKS GESTATION OF PREGNANCY: ICD-10-CM

## 2023-10-11 LAB
GLUCOSE URINE, POC: NEGATIVE
PROTEIN UA: POSITIVE

## 2023-10-11 PROCEDURE — 76820 UMBILICAL ARTERY ECHO: CPT | Performed by: OBSTETRICS & GYNECOLOGY

## 2023-10-11 PROCEDURE — 76815 OB US LIMITED FETUS(S): CPT | Performed by: OBSTETRICS & GYNECOLOGY

## 2023-10-11 PROCEDURE — 81002 URINALYSIS NONAUTO W/O SCOPE: CPT | Performed by: OBSTETRICS & GYNECOLOGY

## 2023-10-11 PROCEDURE — 99213 OFFICE O/P EST LOW 20 MIN: CPT | Performed by: OBSTETRICS & GYNECOLOGY

## 2023-10-11 PROCEDURE — 76821 MIDDLE CEREBRAL ARTERY ECHO: CPT | Performed by: OBSTETRICS & GYNECOLOGY

## 2023-10-11 PROCEDURE — 76818 FETAL BIOPHYS PROFILE W/NST: CPT | Performed by: OBSTETRICS & GYNECOLOGY

## 2023-10-11 PROCEDURE — 99999 PR OFFICE/OUTPT VISIT,PROCEDURE ONLY: CPT | Performed by: OBSTETRICS & GYNECOLOGY

## 2023-10-11 NOTE — PATIENT INSTRUCTIONS
Please arrive for your scheduled appointment at least 15 minutes early with your actual insurance card+ a photo ID. Also if you need any refills ordered or have questions, it may take up 48 hours to reply. Please allow ample time for your refills. Call me when you use last refill. Thank you for your cooperation. You might be having an NST at your next appt. Please eat a large snack or breakfast before coming to office. Thank you Call your primary obstetrician with bleeding, leaking of fluid, abdominal tenderness, headache, blurry vision, epigastric pain and increased urinary frequency. If you are experiencing an emergency and need immediate help, call 911 or go to go emergency room or labor and delivery. Counting Your Baby's Kicks: Care Instructions  Overview     Counting your baby's kicks is one way your doctor can tell that your baby is healthy. Most women--especially in a first pregnancy--feel their baby move for the first time between 16 and 22 weeks. The movement may feel like flutters rather than kicks. Your baby may move more at certain times of the day. When you are active, you may notice less kicking than when you are resting. At your prenatal visits, your doctor will ask whether the baby is active. In your last trimester, your doctor may ask you to count the number of times you feel your baby move. Follow-up care is a key part of your treatment and safety. Be sure to make and go to all appointments, and call your doctor if you are having problems. It's also a good idea to know your test results and keep a list of the medicines you take. How do you count fetal kicks? A common method of checking your baby's movement is to note the length of time it takes to count ten movements (such as kicks, flutters, or rolls). Pick your baby's most active time of day to count. This may be any time from morning to evening.   If you don't feel 10 movements in an hour, have something to eat or drink and count for

## 2023-10-11 NOTE — PROGRESS NOTES
Pt here for bpp/nst  Pt denies bleeding,cramping,contractions or fluid leakage.   Pt c/o back pain  Pt states good fetal movement

## 2023-10-18 ENCOUNTER — ANESTHESIA (OUTPATIENT)
Dept: LABOR AND DELIVERY | Age: 33
End: 2023-10-18
Payer: COMMERCIAL

## 2023-10-18 ENCOUNTER — ANCILLARY PROCEDURE (OUTPATIENT)
Dept: OBGYN CLINIC | Age: 33
DRG: 540 | End: 2023-10-18
Payer: COMMERCIAL

## 2023-10-18 ENCOUNTER — ANESTHESIA EVENT (OUTPATIENT)
Dept: LABOR AND DELIVERY | Age: 33
End: 2023-10-18
Payer: COMMERCIAL

## 2023-10-18 ENCOUNTER — ROUTINE PRENATAL (OUTPATIENT)
Dept: OBGYN | Age: 33
End: 2023-10-18
Payer: COMMERCIAL

## 2023-10-18 ENCOUNTER — HOSPITAL ENCOUNTER (INPATIENT)
Age: 33
LOS: 3 days | Discharge: HOME | DRG: 540 | End: 2023-10-21
Attending: OBSTETRICS & GYNECOLOGY | Admitting: STUDENT IN AN ORGANIZED HEALTH CARE EDUCATION/TRAINING PROGRAM
Payer: COMMERCIAL

## 2023-10-18 ENCOUNTER — ROUTINE PRENATAL (OUTPATIENT)
Dept: OBGYN CLINIC | Age: 33
DRG: 540 | End: 2023-10-18
Payer: COMMERCIAL

## 2023-10-18 VITALS
WEIGHT: 278.25 LBS | HEART RATE: 72 BPM | SYSTOLIC BLOOD PRESSURE: 137 MMHG | DIASTOLIC BLOOD PRESSURE: 85 MMHG | BODY MASS INDEX: 49.29 KG/M2

## 2023-10-18 VITALS — SYSTOLIC BLOOD PRESSURE: 146 MMHG | DIASTOLIC BLOOD PRESSURE: 91 MMHG | HEART RATE: 65 BPM | OXYGEN SATURATION: 99 %

## 2023-10-18 DIAGNOSIS — Z3A.35 35 WEEKS GESTATION OF PREGNANCY: ICD-10-CM

## 2023-10-18 DIAGNOSIS — O30.043 DICHORIONIC DIAMNIOTIC TWIN PREGNANCY IN THIRD TRIMESTER: Primary | ICD-10-CM

## 2023-10-18 DIAGNOSIS — O09.90 HIGH RISK PREGNANCY, ANTEPARTUM: Primary | ICD-10-CM

## 2023-10-18 DIAGNOSIS — Z3A.35 35 WEEKS GESTATION OF PREGNANCY: Primary | ICD-10-CM

## 2023-10-18 DIAGNOSIS — E03.9 HYPOTHYROIDISM, UNSPECIFIED TYPE: ICD-10-CM

## 2023-10-18 LAB
ABO + RH BLD: NORMAL
ALBUMIN SERPL-MCNC: 3.3 G/DL (ref 3.5–5.2)
ALP SERPL-CCNC: 207 U/L (ref 35–104)
ALT SERPL-CCNC: 62 U/L (ref 0–32)
ANION GAP SERPL CALCULATED.3IONS-SCNC: 12 MMOL/L (ref 7–16)
ARM BAND NUMBER: NORMAL
AST SERPL-CCNC: 66 U/L (ref 0–31)
BILIRUB SERPL-MCNC: 0.4 MG/DL (ref 0–1.2)
BLOOD BANK SAMPLE EXPIRATION: NORMAL
BLOOD GROUP ANTIBODIES SERPL: NEGATIVE
BUN SERPL-MCNC: 9 MG/DL (ref 6–20)
CALCIUM SERPL-MCNC: 8.7 MG/DL (ref 8.6–10.2)
CHLORIDE SERPL-SCNC: 101 MMOL/L (ref 98–107)
CO2 SERPL-SCNC: 18 MMOL/L (ref 22–29)
CREAT SERPL-MCNC: 0.6 MG/DL (ref 0.5–1)
CREAT UR-MCNC: 170.9 MG/DL (ref 29–226)
ERYTHROCYTE [DISTWIDTH] IN BLOOD BY AUTOMATED COUNT: 13.5 % (ref 11.5–15)
GFR SERPL CREATININE-BSD FRML MDRD: >60 ML/MIN/1.73M2
GLUCOSE SERPL-MCNC: 74 MG/DL (ref 74–99)
GLUCOSE URINE, POC: NEGATIVE
HCT VFR BLD AUTO: 33.3 % (ref 34–48)
HGB BLD-MCNC: 11.2 G/DL (ref 11.5–15.5)
MCH RBC QN AUTO: 27.5 PG (ref 26–35)
MCHC RBC AUTO-ENTMCNC: 33.6 G/DL (ref 32–34.5)
MCV RBC AUTO: 81.8 FL (ref 80–99.9)
PLATELET # BLD AUTO: 232 K/UL (ref 130–450)
PMV BLD AUTO: 10.8 FL (ref 7–12)
POTASSIUM SERPL-SCNC: 3.9 MMOL/L (ref 3.5–5)
PROT SERPL-MCNC: 7.2 G/DL (ref 6.4–8.3)
PROTEIN UA: POSITIVE
RBC # BLD AUTO: 4.07 M/UL (ref 3.5–5.5)
SODIUM SERPL-SCNC: 131 MMOL/L (ref 132–146)
TOTAL PROTEIN, URINE: 42 MG/DL (ref 0–12)
URATE SERPL-MCNC: 5.6 MG/DL (ref 2.4–5.7)
URINE TOTAL PROTEIN CREATININE RATIO: 0.25 (ref 0–0.2)
WBC OTHER # BLD: 9.5 K/UL (ref 4.5–11.5)

## 2023-10-18 PROCEDURE — G8482 FLU IMMUNIZE ORDER/ADMIN: HCPCS | Performed by: OBSTETRICS & GYNECOLOGY

## 2023-10-18 PROCEDURE — 84156 ASSAY OF PROTEIN URINE: CPT

## 2023-10-18 PROCEDURE — 99213 OFFICE O/P EST LOW 20 MIN: CPT | Performed by: OBSTETRICS & GYNECOLOGY

## 2023-10-18 PROCEDURE — 6360000002 HC RX W HCPCS: Performed by: ANESTHESIOLOGY

## 2023-10-18 PROCEDURE — 6370000000 HC RX 637 (ALT 250 FOR IP): Performed by: OBSTETRICS & GYNECOLOGY

## 2023-10-18 PROCEDURE — 2580000003 HC RX 258: Performed by: NURSE ANESTHETIST, CERTIFIED REGISTERED

## 2023-10-18 PROCEDURE — 3700000001 HC ADD 15 MINUTES (ANESTHESIA): Performed by: OBSTETRICS & GYNECOLOGY

## 2023-10-18 PROCEDURE — 99213 OFFICE O/P EST LOW 20 MIN: CPT

## 2023-10-18 PROCEDURE — 6360000002 HC RX W HCPCS: Performed by: OBSTETRICS & GYNECOLOGY

## 2023-10-18 PROCEDURE — 86901 BLOOD TYPING SEROLOGIC RH(D): CPT

## 2023-10-18 PROCEDURE — G0378 HOSPITAL OBSERVATION PER HR: HCPCS

## 2023-10-18 PROCEDURE — 76818 FETAL BIOPHYS PROFILE W/NST: CPT | Performed by: OBSTETRICS & GYNECOLOGY

## 2023-10-18 PROCEDURE — 3609079900 HC CESAREAN SECTION: Performed by: OBSTETRICS & GYNECOLOGY

## 2023-10-18 PROCEDURE — G8417 CALC BMI ABV UP PARAM F/U: HCPCS | Performed by: OBSTETRICS & GYNECOLOGY

## 2023-10-18 PROCEDURE — 76821 MIDDLE CEREBRAL ARTERY ECHO: CPT | Performed by: OBSTETRICS & GYNECOLOGY

## 2023-10-18 PROCEDURE — 1220000001 HC SEMI PRIVATE L&D R&B

## 2023-10-18 PROCEDURE — 99999 PR OFFICE/OUTPT VISIT,PROCEDURE ONLY: CPT | Performed by: OBSTETRICS & GYNECOLOGY

## 2023-10-18 PROCEDURE — 6360000002 HC RX W HCPCS

## 2023-10-18 PROCEDURE — 82570 ASSAY OF URINE CREATININE: CPT

## 2023-10-18 PROCEDURE — 86850 RBC ANTIBODY SCREEN: CPT

## 2023-10-18 PROCEDURE — 2709999900 HC NON-CHARGEABLE SUPPLY: Performed by: OBSTETRICS & GYNECOLOGY

## 2023-10-18 PROCEDURE — 80053 COMPREHEN METABOLIC PANEL: CPT

## 2023-10-18 PROCEDURE — 84550 ASSAY OF BLOOD/URIC ACID: CPT

## 2023-10-18 PROCEDURE — 2580000003 HC RX 258: Performed by: OBSTETRICS & GYNECOLOGY

## 2023-10-18 PROCEDURE — 7100000001 HC PACU RECOVERY - ADDTL 15 MIN: Performed by: OBSTETRICS & GYNECOLOGY

## 2023-10-18 PROCEDURE — 76815 OB US LIMITED FETUS(S): CPT | Performed by: OBSTETRICS & GYNECOLOGY

## 2023-10-18 PROCEDURE — 6360000002 HC RX W HCPCS: Performed by: STUDENT IN AN ORGANIZED HEALTH CARE EDUCATION/TRAINING PROGRAM

## 2023-10-18 PROCEDURE — 7100000000 HC PACU RECOVERY - FIRST 15 MIN: Performed by: OBSTETRICS & GYNECOLOGY

## 2023-10-18 PROCEDURE — 88307 TISSUE EXAM BY PATHOLOGIST: CPT

## 2023-10-18 PROCEDURE — 85027 COMPLETE CBC AUTOMATED: CPT

## 2023-10-18 PROCEDURE — 3700000000 HC ANESTHESIA ATTENDED CARE: Performed by: OBSTETRICS & GYNECOLOGY

## 2023-10-18 PROCEDURE — G8427 DOCREV CUR MEDS BY ELIG CLIN: HCPCS | Performed by: OBSTETRICS & GYNECOLOGY

## 2023-10-18 PROCEDURE — 1036F TOBACCO NON-USER: CPT | Performed by: OBSTETRICS & GYNECOLOGY

## 2023-10-18 PROCEDURE — 99213 OFFICE O/P EST LOW 20 MIN: CPT | Performed by: PHYSICIAN ASSISTANT

## 2023-10-18 PROCEDURE — 86900 BLOOD TYPING SEROLOGIC ABO: CPT

## 2023-10-18 PROCEDURE — 76820 UMBILICAL ARTERY ECHO: CPT | Performed by: OBSTETRICS & GYNECOLOGY

## 2023-10-18 RX ORDER — BUPIVACAINE HYDROCHLORIDE 7.5 MG/ML
INJECTION, SOLUTION INTRASPINAL PRN
Status: DISCONTINUED | OUTPATIENT
Start: 2023-10-18 | End: 2023-10-18 | Stop reason: SDUPTHER

## 2023-10-18 RX ORDER — PHENYLEPHRINE HCL IN 0.9% NACL 1 MG/10 ML
SYRINGE (ML) INTRAVENOUS PRN
Status: DISCONTINUED | OUTPATIENT
Start: 2023-10-18 | End: 2023-10-18 | Stop reason: SDUPTHER

## 2023-10-18 RX ORDER — BETAMETHASONE SODIUM PHOSPHATE AND BETAMETHASONE ACETATE 3; 3 MG/ML; MG/ML
12 INJECTION, SUSPENSION INTRA-ARTICULAR; INTRALESIONAL; INTRAMUSCULAR; SOFT TISSUE DAILY
Status: DISCONTINUED | OUTPATIENT
Start: 2023-10-18 | End: 2023-10-19

## 2023-10-18 RX ORDER — LABETALOL HYDROCHLORIDE 5 MG/ML
40 INJECTION, SOLUTION INTRAVENOUS
Status: DISCONTINUED | OUTPATIENT
Start: 2023-10-18 | End: 2023-10-19

## 2023-10-18 RX ORDER — GLYCOPYRROLATE 0.2 MG/ML
INJECTION INTRAMUSCULAR; INTRAVENOUS PRN
Status: DISCONTINUED | OUTPATIENT
Start: 2023-10-18 | End: 2023-10-18 | Stop reason: SDUPTHER

## 2023-10-18 RX ORDER — MAGNESIUM SULFATE IN WATER 40 MG/ML
INJECTION, SOLUTION INTRAVENOUS
Status: COMPLETED
Start: 2023-10-18 | End: 2023-10-18

## 2023-10-18 RX ORDER — SODIUM CHLORIDE 9 MG/ML
INJECTION, SOLUTION INTRAVENOUS PRN
Status: DISCONTINUED | OUTPATIENT
Start: 2023-10-18 | End: 2023-10-19 | Stop reason: HOSPADM

## 2023-10-18 RX ORDER — LEVOTHYROXINE SODIUM 112 UG/1
112 TABLET ORAL DAILY
Qty: 30 TABLET | Refills: 5 | Status: SHIPPED | OUTPATIENT
Start: 2023-10-18

## 2023-10-18 RX ORDER — MORPHINE SULFATE 1 MG/ML
INJECTION, SOLUTION EPIDURAL; INTRATHECAL; INTRAVENOUS PRN
Status: DISCONTINUED | OUTPATIENT
Start: 2023-10-18 | End: 2023-10-18 | Stop reason: SDUPTHER

## 2023-10-18 RX ORDER — DIPHENHYDRAMINE HCL 25 MG
25 TABLET ORAL EVERY 6 HOURS PRN
Status: ACTIVE | OUTPATIENT
Start: 2023-10-18 | End: 2023-10-19

## 2023-10-18 RX ORDER — ONDANSETRON 2 MG/ML
INJECTION INTRAMUSCULAR; INTRAVENOUS PRN
Status: DISCONTINUED | OUTPATIENT
Start: 2023-10-18 | End: 2023-10-18 | Stop reason: SDUPTHER

## 2023-10-18 RX ORDER — FENTANYL CITRATE 50 UG/ML
50 INJECTION, SOLUTION INTRAMUSCULAR; INTRAVENOUS EVERY 5 MIN PRN
Status: DISCONTINUED | OUTPATIENT
Start: 2023-10-18 | End: 2023-10-19 | Stop reason: HOSPADM

## 2023-10-18 RX ORDER — DIPHENHYDRAMINE HYDROCHLORIDE 50 MG/ML
25 INJECTION INTRAMUSCULAR; INTRAVENOUS EVERY 6 HOURS PRN
Status: DISPENSED | OUTPATIENT
Start: 2023-10-18 | End: 2023-10-19

## 2023-10-18 RX ORDER — HYDRALAZINE HYDROCHLORIDE 20 MG/ML
10 INJECTION INTRAMUSCULAR; INTRAVENOUS
Status: DISCONTINUED | OUTPATIENT
Start: 2023-10-18 | End: 2023-10-19

## 2023-10-18 RX ORDER — SODIUM CHLORIDE, SODIUM LACTATE, POTASSIUM CHLORIDE, CALCIUM CHLORIDE 600; 310; 30; 20 MG/100ML; MG/100ML; MG/100ML; MG/100ML
INJECTION, SOLUTION INTRAVENOUS CONTINUOUS
Status: DISCONTINUED | OUTPATIENT
Start: 2023-10-18 | End: 2023-10-19

## 2023-10-18 RX ORDER — LABETALOL HYDROCHLORIDE 5 MG/ML
80 INJECTION, SOLUTION INTRAVENOUS
Status: DISCONTINUED | OUTPATIENT
Start: 2023-10-18 | End: 2023-10-19

## 2023-10-18 RX ORDER — SODIUM CHLORIDE, SODIUM LACTATE, POTASSIUM CHLORIDE, CALCIUM CHLORIDE 600; 310; 30; 20 MG/100ML; MG/100ML; MG/100ML; MG/100ML
INJECTION, SOLUTION INTRAVENOUS CONTINUOUS PRN
Status: DISCONTINUED | OUTPATIENT
Start: 2023-10-18 | End: 2023-10-18 | Stop reason: SDUPTHER

## 2023-10-18 RX ORDER — LABETALOL HYDROCHLORIDE 5 MG/ML
20 INJECTION, SOLUTION INTRAVENOUS
Status: DISCONTINUED | OUTPATIENT
Start: 2023-10-18 | End: 2023-10-19

## 2023-10-18 RX ORDER — FENTANYL CITRATE 50 UG/ML
25 INJECTION, SOLUTION INTRAMUSCULAR; INTRAVENOUS EVERY 5 MIN PRN
Status: DISCONTINUED | OUTPATIENT
Start: 2023-10-18 | End: 2023-10-19 | Stop reason: HOSPADM

## 2023-10-18 RX ORDER — ONDANSETRON 2 MG/ML
4 INJECTION INTRAMUSCULAR; INTRAVENOUS EVERY 6 HOURS PRN
Status: DISCONTINUED | OUTPATIENT
Start: 2023-10-18 | End: 2023-10-19 | Stop reason: HOSPADM

## 2023-10-18 RX ORDER — CITRIC ACID/SODIUM CITRATE 334-500MG
30 SOLUTION, ORAL ORAL ONCE
Status: COMPLETED | OUTPATIENT
Start: 2023-10-18 | End: 2023-10-18

## 2023-10-18 RX ORDER — MAGNESIUM SULFATE HEPTAHYDRATE 40 MG/ML
4000 INJECTION, SOLUTION INTRAVENOUS ONCE
Status: COMPLETED | OUTPATIENT
Start: 2023-10-18 | End: 2023-10-18

## 2023-10-18 RX ORDER — CALCIUM GLUCONATE 94 MG/ML
1000 INJECTION, SOLUTION INTRAVENOUS PRN
Status: DISCONTINUED | OUTPATIENT
Start: 2023-10-18 | End: 2023-10-19

## 2023-10-18 RX ORDER — SODIUM CHLORIDE 0.9 % (FLUSH) 0.9 %
5-40 SYRINGE (ML) INJECTION EVERY 12 HOURS SCHEDULED
Status: DISCONTINUED | OUTPATIENT
Start: 2023-10-18 | End: 2023-10-19 | Stop reason: HOSPADM

## 2023-10-18 RX ORDER — SODIUM CHLORIDE 0.9 % (FLUSH) 0.9 %
5-40 SYRINGE (ML) INJECTION PRN
Status: DISCONTINUED | OUTPATIENT
Start: 2023-10-18 | End: 2023-10-19 | Stop reason: HOSPADM

## 2023-10-18 RX ORDER — PROCHLORPERAZINE EDISYLATE 5 MG/ML
5 INJECTION INTRAMUSCULAR; INTRAVENOUS
Status: ACTIVE | OUTPATIENT
Start: 2023-10-18 | End: 2023-10-19

## 2023-10-18 RX ORDER — SODIUM CHLORIDE, SODIUM LACTATE, POTASSIUM CHLORIDE, AND CALCIUM CHLORIDE .6; .31; .03; .02 G/100ML; G/100ML; G/100ML; G/100ML
1000 INJECTION, SOLUTION INTRAVENOUS ONCE
Status: DISCONTINUED | OUTPATIENT
Start: 2023-10-18 | End: 2023-10-19

## 2023-10-18 RX ADMIN — Medication 250 ML: at 21:03

## 2023-10-18 RX ADMIN — GLYCOPYRROLATE 0.2 MG: 0.2 INJECTION INTRAMUSCULAR; INTRAVENOUS at 20:58

## 2023-10-18 RX ADMIN — Medication 100 MCG: at 21:00

## 2023-10-18 RX ADMIN — ONDANSETRON 4 MG: 2 INJECTION INTRAMUSCULAR; INTRAVENOUS at 20:37

## 2023-10-18 RX ADMIN — SODIUM CITRATE AND CITRIC ACID MONOHYDRATE 30 ML: 500; 334 SOLUTION ORAL at 20:06

## 2023-10-18 RX ADMIN — FENTANYL CITRATE 50 MCG: 50 INJECTION INTRAMUSCULAR; INTRAVENOUS at 23:19

## 2023-10-18 RX ADMIN — BETAMETHASONE SODIUM PHOSPHATE AND BETAMETHASONE ACETATE 12 MG: 3; 3 INJECTION, SUSPENSION INTRA-ARTICULAR; INTRALESIONAL; INTRAMUSCULAR at 18:12

## 2023-10-18 RX ADMIN — MAGNESIUM SULFATE HEPTAHYDRATE 4000 MG: 40 INJECTION, SOLUTION INTRAVENOUS at 20:05

## 2023-10-18 RX ADMIN — Medication 87.3 MILLI-UNITS/MIN: at 22:30

## 2023-10-18 RX ADMIN — Medication 100 MCG: at 21:14

## 2023-10-18 RX ADMIN — WATER 2000 MG: 1 INJECTION INTRAMUSCULAR; INTRAVENOUS; SUBCUTANEOUS at 20:06

## 2023-10-18 RX ADMIN — Medication 100 MCG: at 20:51

## 2023-10-18 RX ADMIN — DIPHENHYDRAMINE HYDROCHLORIDE 25 MG: 50 INJECTION, SOLUTION INTRAMUSCULAR; INTRAVENOUS at 22:16

## 2023-10-18 RX ADMIN — MORPHINE SULFATE 0.15 MG: 1 INJECTION, SOLUTION EPIDURAL; INTRATHECAL; INTRAVENOUS at 20:47

## 2023-10-18 RX ADMIN — BUPIVACAINE HYDROCHLORIDE 1.6 ML: 7.5 INJECTION, SOLUTION INTRASPINAL at 20:47

## 2023-10-18 RX ADMIN — Medication 2000 MG/HR: at 20:32

## 2023-10-18 RX ADMIN — FENTANYL CITRATE 50 MCG: 50 INJECTION INTRAMUSCULAR; INTRAVENOUS at 22:27

## 2023-10-18 RX ADMIN — Medication 100 MCG: at 21:07

## 2023-10-18 RX ADMIN — SODIUM CHLORIDE, POTASSIUM CHLORIDE, SODIUM LACTATE AND CALCIUM CHLORIDE: 600; 310; 30; 20 INJECTION, SOLUTION INTRAVENOUS at 20:32

## 2023-10-18 RX ADMIN — Medication 100 MCG: at 20:57

## 2023-10-18 ASSESSMENT — PAIN SCALES - GENERAL
PAINLEVEL_OUTOF10: 7
PAINLEVEL_OUTOF10: 6

## 2023-10-18 ASSESSMENT — PAIN DESCRIPTION - LOCATION
LOCATION: ABDOMEN
LOCATION: ABDOMEN

## 2023-10-18 NOTE — PROGRESS NOTES
Sharyne Nixon called and on review thinks patient should proceed towards delivery. Plan to start Mg now and then proceed towards delivery.  I discussed this with the patient and recommendation to not wait till 10pm. Patient is amenable will call her  to get him to come here.   -start Mg  -prepare for csection

## 2023-10-18 NOTE — ANESTHESIA PRE PROCEDURE
content                Deana Cruz, APRN - CRNA   10/18/2023      DOS STAFF ADDENDUM:    Pt seen and examined, physical exam updated, chart reviewed including anesthesia, drug and allergy history. H&P reviewed. No interval changes to history or physical examination (unless noted above). NPO status confirmed. Anesthetic plan, risks, benefits, alternatives discussed with patient. Patient verbalized an understanding and agrees to proceed.      Daniel Rock MD   Anesthesiologist

## 2023-10-18 NOTE — H&P
Department of Obstetrics and Gynecology  Labor and Delivery   Physician Assistant Obstetrics History and Physical      Patient Name: Selvin Casillas  YOB: 1990   MRN: [id number]    CHIEF COMPLAINT:  elevated blood pressure    HISTORY OF PRESENT ILLNESS:    The patient is a 35 y.o. female,  A0, Estimated Date of Delivery: 23, EGA: 35 and 6/7 weeks presents to L&D Antepartum from [facility] . Patient denies leakage of fluid, vaginal bleeding, contractions, cramping, swelling, RUQ or epigastric pain, headache, visual changes, or urinary symptoms. Perceived fetal movement is good.     Her current obstetrical history is significant for:   Previous term   Morbid obesity: BMI 49.29 kg/m2    PAST OB HISTORY  OB History    Para Term  AB Living   2 1 1     1   SAB IAB Ectopic Molar Multiple Live Births           0 1      # Outcome Date GA Lbr Marcelo/2nd Weight Sex Delivery Anes PTL Lv   2 Current            1 Term 10/16/22 40w0d 13:38 / 00:30 3.48 kg (7 lb 10.8 oz) M Vag-Spont EPI, Local N LETICIA       Past Medical History:    Diagnosis Date    Hypothyroidism         Past Surgical History:    Procedure Laterality Date    FOOT SURGERY      left foot and leg stepped on a needle    US BREAST BIOPSY W LOC DEVICE 1ST LESION LEFT Left 2023    US BREAST BIOPSY W LOC DEVICE 1ST LESION LEFT 2023 [facility]        Medications Prior to Admission:  Medications Prior to Admission: levothyroxine (SYNTHROID) 112 MCG tablet, Take 1 tablet by mouth daily  ferrous sulfate (IRON 325) 325 (65 Fe) MG tablet, Take 1 tablet by mouth every other day  folic acid (FOLVITE) 1 MG tablet, Take 1 tablet by mouth daily  aspirin 81 MG EC tablet, Take 1 tablet by mouth daily  Prenatal Vit-Fe Fumarate-FA (PRENATAL PLUS) 27-1 MG TABS, Take 1 tablet by mouth daily (Patient not taking: Reported on 10/18/2023)  vitamin C (ASCORBIC ACID) 500 MG tablet, Take 0.5 tablets by mouth every other day    Allergies:  Patient has no

## 2023-10-18 NOTE — PROGRESS NOTES
Pt being seen for BPP/NST  Pt denies bleeding,cramping,contractions or fluid leakage. Good fetal movement. Pt has no complaints.

## 2023-10-18 NOTE — PROGRESS NOTES
[facility] Note    I was asked by Dr. Cristina Zaragoza to review the patient's care plan. The patient is a 70-year-old G para 1-0-0-1 currently at 35 weeks 6 days (LMP = 25 [address]) with a dichorionic/diamniotic twin gestation who was sent to the hospital from the office secondary to elevated blood pressures. The patient's. Records were reviewed. At 34 weeks 6 days, her blood pressure was noted to be elevated at 160/93. Today in the office, her blood pressures were elevated at 151/100 and 146/91. She was sent to the hospital for additional evaluation. After arrival to the hospital, her blood pressures were normal to mild range. Per the admission H&P, the patient denied having any associated symptoms of headache, vision change, nausea, vomiting, and or right upper quadrant pain. Her admission labs included: H/H 11.2/33.3, MCV 81.8, platelet count 264,194, potassium 3.9, creatinine 0.6, calcium 8.7, ALT 62 (0-32) AST 66 (0-31), uric acid 5.6, and urine protein creatinine ratio 0.25. Betamethasone was ordered for fetal benefit. At this time, there is concern for severe gestational hypertension versus preeclampsia with severe features given her history of a prior severe blood pressure elevation and elevated AST and ALT. The AST is currently 2 times the upper limit of normal.  The absence of any other explanation for the elevated LFTs, there is concern for severe preeclampsia/partial HELLP syndrome. Initially, I had recommended repeating the labs and proceeding with delivery if the LFTs are further elevated. However, given the prior severe range blood pressure elevation, BP elevations in the office, elevated AST/ALT, and gestational age, I would recommend proceeding with delivery. --I recommend magnesium sulfate for seizure prophylaxis. --The patient's labs should be monitored serially. Dr. Cristina Zaragoza was contacted regarding the recommendations above. Please call with any additional questions or concerns.

## 2023-10-18 NOTE — PROGRESS NOTES
Patient alert and pleasant today with no complaints. Here today for prenatal visit. Urine for glucose and protein obtained earlier today at [facility]. Protein trace, and Glucose negative. Pt states she needs a refill for Synthroid. Pt BP is elevated. Pt states she feels fine. Discharge instructions given and patient directed to call the office with any questions or concerns and verbalized understanding.

## 2023-10-18 NOTE — PROGRESS NOTES
35W6D   Pt presents to antepartum from clinic for increased BP's. Pt complaining of nothing. Pt denies LOF, VB, CX. +FM for both twins.

## 2023-10-18 NOTE — PROGRESS NOTES
No evaluation by me today with those blood pressures I sent her directly to antepartum for further evaluation PIH labs and monitoring. Plus minus delivery depending on those results.

## 2023-10-19 LAB
BASOPHILS # BLD: 0.01 K/UL (ref 0–0.2)
BASOPHILS NFR BLD: 0 % (ref 0–2)
EOSINOPHIL # BLD: 0 K/UL (ref 0.05–0.5)
EOSINOPHILS RELATIVE PERCENT: 0 % (ref 0–6)
ERYTHROCYTE [DISTWIDTH] IN BLOOD BY AUTOMATED COUNT: 13.6 % (ref 11.5–15)
HCT VFR BLD AUTO: 28.9 % (ref 34–48)
HGB BLD-MCNC: 9.7 G/DL (ref 11.5–15.5)
IMM GRANULOCYTES # BLD AUTO: 0.07 K/UL (ref 0–0.58)
IMM GRANULOCYTES NFR BLD: 1 % (ref 0–5)
LYMPHOCYTES NFR BLD: 1.42 K/UL (ref 1.5–4)
LYMPHOCYTES RELATIVE PERCENT: 10 % (ref 20–42)
MCH RBC QN AUTO: 27.9 PG (ref 26–35)
MCHC RBC AUTO-ENTMCNC: 33.6 G/DL (ref 32–34.5)
MCV RBC AUTO: 83 FL (ref 80–99.9)
MONOCYTES NFR BLD: 0.44 K/UL (ref 0.1–0.95)
MONOCYTES NFR BLD: 3 % (ref 2–12)
NEUTROPHILS NFR BLD: 87 % (ref 43–80)
NEUTS SEG NFR BLD: 12.66 K/UL (ref 1.8–7.3)
PLATELET # BLD AUTO: 236 K/UL (ref 130–450)
PMV BLD AUTO: 10.2 FL (ref 7–12)
RBC # BLD AUTO: 3.48 M/UL (ref 3.5–5.5)
WBC OTHER # BLD: 14.6 K/UL (ref 4.5–11.5)

## 2023-10-19 PROCEDURE — 85025 COMPLETE CBC W/AUTO DIFF WBC: CPT

## 2023-10-19 PROCEDURE — 2580000003 HC RX 258: Performed by: OBSTETRICS & GYNECOLOGY

## 2023-10-19 PROCEDURE — 6370000000 HC RX 637 (ALT 250 FOR IP): Performed by: OBSTETRICS & GYNECOLOGY

## 2023-10-19 PROCEDURE — 1220000000 HC SEMI PRIVATE OB R&B

## 2023-10-19 PROCEDURE — 6360000002 HC RX W HCPCS: Performed by: OBSTETRICS & GYNECOLOGY

## 2023-10-19 PROCEDURE — 6360000002 HC RX W HCPCS: Performed by: ANESTHESIOLOGY

## 2023-10-19 PROCEDURE — 6370000000 HC RX 637 (ALT 250 FOR IP): Performed by: ANESTHESIOLOGY

## 2023-10-19 RX ORDER — PANTOPRAZOLE SODIUM 40 MG/1
40 TABLET, DELAYED RELEASE ORAL DAILY
Status: DISCONTINUED | OUTPATIENT
Start: 2023-10-19 | End: 2023-10-21 | Stop reason: HOSPADM

## 2023-10-19 RX ORDER — SODIUM CHLORIDE 0.9 % (FLUSH) 0.9 %
5-40 SYRINGE (ML) INJECTION EVERY 12 HOURS SCHEDULED
Status: DISCONTINUED | OUTPATIENT
Start: 2023-10-19 | End: 2023-10-21 | Stop reason: HOSPADM

## 2023-10-19 RX ORDER — OXYCODONE HYDROCHLORIDE 5 MG/1
10 TABLET ORAL EVERY 4 HOURS PRN
Status: DISPENSED | OUTPATIENT
Start: 2023-10-19 | End: 2023-10-20

## 2023-10-19 RX ORDER — FERROUS SULFATE 325(65) MG
325 TABLET ORAL 2 TIMES DAILY WITH MEALS
Status: DISCONTINUED | OUTPATIENT
Start: 2023-10-19 | End: 2023-10-20

## 2023-10-19 RX ORDER — DOCUSATE SODIUM 100 MG/1
100 CAPSULE, LIQUID FILLED ORAL 2 TIMES DAILY
Status: DISCONTINUED | OUTPATIENT
Start: 2023-10-19 | End: 2023-10-21 | Stop reason: HOSPADM

## 2023-10-19 RX ORDER — DIPHENHYDRAMINE HYDROCHLORIDE 50 MG/ML
25 INJECTION INTRAMUSCULAR; INTRAVENOUS EVERY 6 HOURS PRN
Status: DISCONTINUED | OUTPATIENT
Start: 2023-10-19 | End: 2023-10-21 | Stop reason: HOSPADM

## 2023-10-19 RX ORDER — SIMETHICONE 80 MG
80 TABLET,CHEWABLE ORAL EVERY 6 HOURS PRN
Status: DISCONTINUED | OUTPATIENT
Start: 2023-10-19 | End: 2023-10-21 | Stop reason: HOSPADM

## 2023-10-19 RX ORDER — PRENATAL WITH FERROUS FUM AND FOLIC ACID 3080; 920; 120; 400; 22; 1.84; 3; 20; 10; 1; 12; 200; 27; 25; 2 [IU]/1; [IU]/1; MG/1; [IU]/1; MG/1; MG/1; MG/1; MG/1; MG/1; MG/1; UG/1; MG/1; MG/1; MG/1; MG/1
1 TABLET ORAL DAILY
Status: DISCONTINUED | OUTPATIENT
Start: 2023-10-19 | End: 2023-10-21 | Stop reason: HOSPADM

## 2023-10-19 RX ORDER — BISACODYL 10 MG
10 SUPPOSITORY, RECTAL RECTAL DAILY PRN
Status: DISCONTINUED | OUTPATIENT
Start: 2023-10-19 | End: 2023-10-21 | Stop reason: HOSPADM

## 2023-10-19 RX ORDER — MORPHINE SULFATE 4 MG/ML
4 INJECTION, SOLUTION INTRAMUSCULAR; INTRAVENOUS
Status: DISCONTINUED | OUTPATIENT
Start: 2023-10-19 | End: 2023-10-21 | Stop reason: HOSPADM

## 2023-10-19 RX ORDER — ONDANSETRON 2 MG/ML
4 INJECTION INTRAMUSCULAR; INTRAVENOUS EVERY 6 HOURS PRN
Status: ACTIVE | OUTPATIENT
Start: 2023-10-19 | End: 2023-10-20

## 2023-10-19 RX ORDER — OXYCODONE HYDROCHLORIDE 5 MG/1
5 TABLET ORAL EVERY 4 HOURS PRN
Status: ACTIVE | OUTPATIENT
Start: 2023-10-19 | End: 2023-10-20

## 2023-10-19 RX ORDER — ONDANSETRON 2 MG/ML
4 INJECTION INTRAMUSCULAR; INTRAVENOUS EVERY 6 HOURS PRN
Status: DISCONTINUED | OUTPATIENT
Start: 2023-10-19 | End: 2023-10-21 | Stop reason: HOSPADM

## 2023-10-19 RX ORDER — EPHEDRINE SULFATE/0.9% NACL/PF 50 MG/5 ML
10 SYRINGE (ML) INTRAVENOUS EVERY 10 MIN PRN
Status: DISCONTINUED | OUTPATIENT
Start: 2023-10-19 | End: 2023-10-19 | Stop reason: HOSPADM

## 2023-10-19 RX ORDER — SODIUM CHLORIDE, SODIUM LACTATE, POTASSIUM CHLORIDE, CALCIUM CHLORIDE 600; 310; 30; 20 MG/100ML; MG/100ML; MG/100ML; MG/100ML
INJECTION, SOLUTION INTRAVENOUS CONTINUOUS
Status: DISCONTINUED | OUTPATIENT
Start: 2023-10-19 | End: 2023-10-21 | Stop reason: HOSPADM

## 2023-10-19 RX ORDER — SODIUM CHLORIDE 0.9 % (FLUSH) 0.9 %
5-40 SYRINGE (ML) INJECTION PRN
Status: DISCONTINUED | OUTPATIENT
Start: 2023-10-19 | End: 2023-10-21 | Stop reason: HOSPADM

## 2023-10-19 RX ORDER — OXYCODONE HYDROCHLORIDE 5 MG/1
10 TABLET ORAL EVERY 4 HOURS PRN
Status: DISCONTINUED | OUTPATIENT
Start: 2023-10-20 | End: 2023-10-21 | Stop reason: HOSPADM

## 2023-10-19 RX ORDER — MORPHINE SULFATE 2 MG/ML
2 INJECTION, SOLUTION INTRAMUSCULAR; INTRAVENOUS
Status: DISCONTINUED | OUTPATIENT
Start: 2023-10-19 | End: 2023-10-21 | Stop reason: HOSPADM

## 2023-10-19 RX ORDER — ONDANSETRON 4 MG/1
8 TABLET, ORALLY DISINTEGRATING ORAL EVERY 8 HOURS PRN
Status: DISCONTINUED | OUTPATIENT
Start: 2023-10-19 | End: 2023-10-21 | Stop reason: HOSPADM

## 2023-10-19 RX ORDER — SODIUM CHLORIDE 9 MG/ML
INJECTION, SOLUTION INTRAVENOUS PRN
Status: DISCONTINUED | OUTPATIENT
Start: 2023-10-19 | End: 2023-10-21 | Stop reason: HOSPADM

## 2023-10-19 RX ORDER — ACETAMINOPHEN 500 MG
1000 TABLET ORAL EVERY 8 HOURS SCHEDULED
Status: DISCONTINUED | OUTPATIENT
Start: 2023-10-19 | End: 2023-10-21 | Stop reason: HOSPADM

## 2023-10-19 RX ORDER — IBUPROFEN 600 MG/1
600 TABLET ORAL EVERY 8 HOURS SCHEDULED
Status: DISCONTINUED | OUTPATIENT
Start: 2023-10-19 | End: 2023-10-21 | Stop reason: HOSPADM

## 2023-10-19 RX ORDER — OXYCODONE HYDROCHLORIDE 5 MG/1
5 TABLET ORAL EVERY 4 HOURS PRN
Status: DISCONTINUED | OUTPATIENT
Start: 2023-10-20 | End: 2023-10-21 | Stop reason: HOSPADM

## 2023-10-19 RX ORDER — MODIFIED LANOLIN
OINTMENT (GRAM) TOPICAL
Status: DISCONTINUED | OUTPATIENT
Start: 2023-10-19 | End: 2023-10-21 | Stop reason: HOSPADM

## 2023-10-19 RX ADMIN — METFORMIN HYDROCHLORIDE 1 TABLET: 500 TABLET, EXTENDED RELEASE ORAL at 10:49

## 2023-10-19 RX ADMIN — WATER 1000 MG: 1 INJECTION INTRAMUSCULAR; INTRAVENOUS; SUBCUTANEOUS at 10:52

## 2023-10-19 RX ADMIN — Medication 2000 MG/HR: at 05:58

## 2023-10-19 RX ADMIN — WATER 1000 MG: 1 INJECTION INTRAMUSCULAR; INTRAVENOUS; SUBCUTANEOUS at 18:40

## 2023-10-19 RX ADMIN — SIMETHICONE 80 MG: 80 TABLET, CHEWABLE ORAL at 23:31

## 2023-10-19 RX ADMIN — OXYCODONE HYDROCHLORIDE 10 MG: 5 TABLET ORAL at 01:20

## 2023-10-19 RX ADMIN — DIPHENHYDRAMINE HYDROCHLORIDE 25 MG: 50 INJECTION, SOLUTION INTRAMUSCULAR; INTRAVENOUS at 05:55

## 2023-10-19 RX ADMIN — MAGNESIUM SULFATE HEPTAHYDRATE 2000 MG/HR: 40 INJECTION, SOLUTION INTRAVENOUS at 16:23

## 2023-10-19 RX ADMIN — IBUPROFEN 600 MG: 600 TABLET ORAL at 18:38

## 2023-10-19 RX ADMIN — FERROUS SULFATE TAB 325 MG (65 MG ELEMENTAL FE) 325 MG: 325 (65 FE) TAB at 18:37

## 2023-10-19 RX ADMIN — DOCUSATE SODIUM 100 MG: 100 CAPSULE, LIQUID FILLED ORAL at 10:51

## 2023-10-19 RX ADMIN — ACETAMINOPHEN 1000 MG: 500 TABLET ORAL at 23:23

## 2023-10-19 RX ADMIN — FERROUS SULFATE TAB 325 MG (65 MG ELEMENTAL FE) 325 MG: 325 (65 FE) TAB at 10:51

## 2023-10-19 RX ADMIN — DOCUSATE SODIUM 100 MG: 100 CAPSULE, LIQUID FILLED ORAL at 22:09

## 2023-10-19 RX ADMIN — SIMETHICONE 80 MG: 80 TABLET, CHEWABLE ORAL at 16:22

## 2023-10-19 RX ADMIN — SODIUM CHLORIDE, POTASSIUM CHLORIDE, SODIUM LACTATE AND CALCIUM CHLORIDE: 600; 310; 30; 20 INJECTION, SOLUTION INTRAVENOUS at 18:41

## 2023-10-19 RX ADMIN — ACETAMINOPHEN 1000 MG: 500 TABLET ORAL at 13:28

## 2023-10-19 RX ADMIN — SODIUM CHLORIDE, POTASSIUM CHLORIDE, SODIUM LACTATE AND CALCIUM CHLORIDE: 600; 310; 30; 20 INJECTION, SOLUTION INTRAVENOUS at 05:45

## 2023-10-19 RX ADMIN — PANTOPRAZOLE SODIUM 40 MG: 40 TABLET, DELAYED RELEASE ORAL at 10:50

## 2023-10-19 RX ADMIN — DIPHENHYDRAMINE HYDROCHLORIDE 25 MG: 50 INJECTION, SOLUTION INTRAMUSCULAR; INTRAVENOUS at 13:30

## 2023-10-19 ASSESSMENT — PAIN SCALES - GENERAL
PAINLEVEL_OUTOF10: 0
PAINLEVEL_OUTOF10: 5
PAINLEVEL_OUTOF10: 5

## 2023-10-19 ASSESSMENT — PAIN DESCRIPTION - LOCATION
LOCATION: ABDOMEN
LOCATION: HEAD

## 2023-10-19 NOTE — PROCEDURES
PREOPERATIVE DIAGNOSES:     1.  35 6/7 week intrauterine pregnancy. 2.  Severe preeclampsia/hellp syndrome      POSTOPERATIVE DIAGNOSES:     Same. PROCEDURE: Primary low transverse  section. SURGEON:  Haider Ding MD      ESTIMATED BLOOD LOSS: Pending QBL. COMPLICATIONS:  None. ANESTHESIA: Spinal.         FINDINGS: Baby A breech 5 pounds 4 ounces 2370 g Apgars 8 and 9 2103 hrs. Baby B vertex 5 pounds even Apgars 8 and 9 delivery time 2103     tubes and ovaries bilaterally. DETAILS OF PROCEDURE: After consent was confirmed the patient she was taken the operating room and prepped and draped in dorsal supine position under spinal anesthesia that was notably adequate. Patient is a  2 para 1 at 35-6/7 weeks who presented from the clinic to labor and delivery with elevated blood pressures MFM was consulted diagnosis of severe preeclampsia/help syndrome was made recommendation for delivery via  section since baby A was breech consent was confirmed with the patient she was taken operating room and prepped and draped in dorsal send present supine position under spinal anesthesia Pfannenstiel skin incision made with scalpel carried the underlying layer of fascia with the Bovie. Fascia was nicked the midline transected bilaterally with Bovie dissected superiorly and inferiorly from rectus sheath without complication. Rectus  midline peritoneum was elevated and entered sharply dissected bilaterally manually the Brenton retractor was placed the vesicouterine peritoneum was incised in a spiral fashion bladder flap was created uterus incised with a scalpel superior to this membranes ruptured noted be clear and freely flowing baby A was delivered breech vigorous of birth nose and mouth were suctioned with bulb suction clamped cord was cut clamped and cut after 32nd delay under direction of the NICU staff handing off the waiting NICU staff.   Cord gases

## 2023-10-19 NOTE — PROGRESS NOTES
[employer] [place] [facility] screening results were discussed with parent. Questions answered. Brochure given to parent. Advised to monitor developmental milestones and contact physician for any concerns.    Newman Medici

## 2023-10-19 NOTE — PROGRESS NOTES
Spoke with anesthesia. Pt is not having any respiratory symptoms. lung sounds clear.  Pt does not have to be on end tidal co2 detector

## 2023-10-19 NOTE — PROGRESS NOTES
Delivery of viable baby girl (B) via primary LTCS at 2101. APGARs 8/9. Infant to NNN after recovery.

## 2023-10-19 NOTE — LACTATION NOTE
Second time mom provided breast milk for over three months with her first baby. Baby A is in the NICU and baby B is bedside. Breastfeeding hasn't been initiated yet but encouraged mom to call me for assistance. The [employer] EBP and Primo Lacto colostrum collection system is in place and mom stated she has been too tired to try pumping. Went over the pumping schedule and encouraged her to pump every 2-3 hours for 15-20 minutes. Mom has an electric breast pump for home. Went over breastfeeding resources.

## 2023-10-19 NOTE — ANESTHESIA PROCEDURE NOTES
Spinal Block    Patient location during procedure: OB  Reason for block: primary anesthetic and at surgeon's request  Staffing  Performed: resident/CRNA   Anesthesiologist: Lm Dasilva MD  Resident/CRNA: SUNNY Chinchilla CRNA  Performed by: SUNNY Chinchilla CRNA  Authorized by: Lm Dasilva MD    Spinal Block  Patient position: sitting  Prep: ChloraPrep  Patient monitoring: cardiac monitor, continuous pulse ox, continuous capnometry and frequent blood pressure checks  Approach: right paramedian  Location: L3/L4  Provider prep: mask, sterile gloves and sterile gown  Local infiltration: lidocaine  Needle  Needle type: Sprotte Tip   Needle gauge: 25 G  Needle length: 4 in  Assessment  Sensory level: T4  Swirl obtained: Yes  CSF: clear  Attempts: 2  Hemodynamics: stable  Preanesthetic Checklist  Completed: patient identified, IV checked, site marked, risks and benefits discussed, surgical/procedural consents, equipment checked, pre-op evaluation, timeout performed, anesthesia consent given, oxygen available and monitors applied/VS acknowledged

## 2023-10-19 NOTE — ANESTHESIA POSTPROCEDURE EVALUATION
Department of Anesthesiology  Postprocedure Note    Patient: Laurence Oliver  MRN: [id number]  YOB: 1990  Date of evaluation: 10/19/2023      Procedure Summary     Date: 10/18/23 Room / Location: [facility] OR [address] [facility]    Anesthesia Start:  Anesthesia Stop:     Procedure:  SECTION (Uterus) Diagnosis:       Elevated blood pressure affecting pregnancy in second trimester, antepartum      (Elevated blood pressure affecting pregnancy in second trimester, antepartum [O16.2])    Surgeons: Yeison Hayes MD Responsible Provider: Debbie Dowling MD    Anesthesia Type: Spinal ASA Status: 2          Anesthesia Type: Spinal    Nikhil Phase I: Nikhil Score: 10    Nikhil Phase II: Nikhil Score: 10      Anesthesia Post Evaluation    Patient location during evaluation: bedside  Patient participation: complete - patient participated  Level of consciousness: awake and alert  Pain score: 1  Airway patency: patent  Nausea & Vomiting: no nausea and no vomiting  Complications: no  Cardiovascular status: blood pressure returned to baseline and hemodynamically stable  Respiratory status: acceptable  Hydration status: stable  Pain management: adequate and satisfactory to patient

## 2023-10-20 LAB
ERYTHROCYTE [DISTWIDTH] IN BLOOD BY AUTOMATED COUNT: 13.8 % (ref 11.5–15)
HCT VFR BLD AUTO: 25.9 % (ref 34–48)
HGB BLD-MCNC: 8.6 G/DL (ref 11.5–15.5)
MCH RBC QN AUTO: 27.7 PG (ref 26–35)
MCHC RBC AUTO-ENTMCNC: 33.2 G/DL (ref 32–34.5)
MCV RBC AUTO: 83.3 FL (ref 80–99.9)
PLATELET # BLD AUTO: 188 K/UL (ref 130–450)
PMV BLD AUTO: 10.3 FL (ref 7–12)
RBC # BLD AUTO: 3.11 M/UL (ref 3.5–5.5)
WBC OTHER # BLD: 11.9 K/UL (ref 4.5–11.5)

## 2023-10-20 PROCEDURE — 99231 SBSQ HOSP IP/OBS SF/LOW 25: CPT | Performed by: ADVANCED PRACTICE MIDWIFE

## 2023-10-20 PROCEDURE — 85027 COMPLETE CBC AUTOMATED: CPT

## 2023-10-20 PROCEDURE — 1220000000 HC SEMI PRIVATE OB R&B

## 2023-10-20 PROCEDURE — 6360000002 HC RX W HCPCS: Performed by: ADVANCED PRACTICE MIDWIFE

## 2023-10-20 PROCEDURE — 2580000003 HC RX 258: Performed by: OBSTETRICS & GYNECOLOGY

## 2023-10-20 PROCEDURE — 6370000000 HC RX 637 (ALT 250 FOR IP): Performed by: ADVANCED PRACTICE MIDWIFE

## 2023-10-20 PROCEDURE — 2580000003 HC RX 258: Performed by: ADVANCED PRACTICE MIDWIFE

## 2023-10-20 PROCEDURE — 6370000000 HC RX 637 (ALT 250 FOR IP): Performed by: OBSTETRICS & GYNECOLOGY

## 2023-10-20 RX ORDER — LEVOTHYROXINE SODIUM 112 UG/1
112 TABLET ORAL DAILY
Status: DISCONTINUED | OUTPATIENT
Start: 2023-10-20 | End: 2023-10-21 | Stop reason: HOSPADM

## 2023-10-20 RX ORDER — FERROUS SULFATE 325(65) MG
325 TABLET ORAL
Status: DISCONTINUED | OUTPATIENT
Start: 2023-10-20 | End: 2023-10-21 | Stop reason: HOSPADM

## 2023-10-20 RX ADMIN — PANTOPRAZOLE SODIUM 40 MG: 40 TABLET, DELAYED RELEASE ORAL at 06:34

## 2023-10-20 RX ADMIN — METFORMIN HYDROCHLORIDE 1 TABLET: 500 TABLET, EXTENDED RELEASE ORAL at 09:00

## 2023-10-20 RX ADMIN — IBUPROFEN 600 MG: 600 TABLET ORAL at 20:03

## 2023-10-20 RX ADMIN — IBUPROFEN 600 MG: 600 TABLET ORAL at 11:38

## 2023-10-20 RX ADMIN — DOCUSATE SODIUM 100 MG: 100 CAPSULE, LIQUID FILLED ORAL at 20:03

## 2023-10-20 RX ADMIN — FERROUS SULFATE TAB 325 MG (65 MG ELEMENTAL FE) 325 MG: 325 (65 FE) TAB at 09:01

## 2023-10-20 RX ADMIN — SODIUM CHLORIDE, PRESERVATIVE FREE 10 ML: 5 INJECTION INTRAVENOUS at 09:09

## 2023-10-20 RX ADMIN — OXYCODONE HYDROCHLORIDE 5 MG: 5 TABLET ORAL at 22:35

## 2023-10-20 RX ADMIN — SIMETHICONE 80 MG: 80 TABLET, CHEWABLE ORAL at 15:55

## 2023-10-20 RX ADMIN — DOCUSATE SODIUM 100 MG: 100 CAPSULE, LIQUID FILLED ORAL at 09:01

## 2023-10-20 RX ADMIN — IBUPROFEN 600 MG: 600 TABLET ORAL at 02:06

## 2023-10-20 RX ADMIN — LEVOTHYROXINE SODIUM 112 MCG: 0.11 TABLET ORAL at 09:00

## 2023-10-20 RX ADMIN — Medication: at 20:02

## 2023-10-20 RX ADMIN — ACETAMINOPHEN 1000 MG: 500 TABLET ORAL at 15:50

## 2023-10-20 RX ADMIN — ACETAMINOPHEN 1000 MG: 500 TABLET ORAL at 06:33

## 2023-10-20 RX ADMIN — IRON SUCROSE 200 MG: 20 INJECTION, SOLUTION INTRAVENOUS at 09:08

## 2023-10-20 ASSESSMENT — PAIN SCALES - GENERAL
PAINLEVEL_OUTOF10: 6
PAINLEVEL_OUTOF10: 5
PAINLEVEL_OUTOF10: 6
PAINLEVEL_OUTOF10: 5

## 2023-10-20 ASSESSMENT — PAIN DESCRIPTION - LOCATION
LOCATION: ABDOMEN
LOCATION: ABDOMEN

## 2023-10-20 ASSESSMENT — PAIN DESCRIPTION - DESCRIPTORS
DESCRIPTORS: ACHING;CRAMPING;DISCOMFORT
DESCRIPTORS: ACHING;DISCOMFORT;CRAMPING

## 2023-10-20 NOTE — PLAN OF CARE
Problem: ABCDS Injury Assessment  Goal: Absence of physical injury  Outcome: Progressing     Problem: Skin/Tissue Integrity  Goal: Absence of new skin breakdown  Description: 1. Monitor for areas of redness and/or skin breakdown  2. Assess vascular access sites hourly  3. Every 4-6 hours minimum:  Change oxygen saturation probe site  4. Every 4-6 hours:  If on nasal continuous positive airway pressure, respiratory therapy assess nares and determine need for appliance change or resting period.   Outcome: Progressing     Problem: Pain  Goal: Verbalizes/displays adequate comfort level or baseline comfort level  Outcome: Progressing  Flowsheets (Taken 10/19/2023 5794)  Verbalizes/displays adequate comfort level or baseline comfort level: Assess pain using appropriate pain scale

## 2023-10-20 NOTE — LACTATION NOTE
Mom has been formula feeding her twin here. Pumped yesterday once. Reviewed the importance of maintaining a pumping routine to stimulate production and provide colostrum for her Nicu baby, States understanding. Has supplies needed. Support provided and encouraged to call with any needs.

## 2023-10-20 NOTE — PROGRESS NOTES
Subjective:     Postpartum Day 2:  Delivery, Primary,  S/P Severe pre-E    The patient feels tired. The patient denies emotional concerns. Pain is moderately controlled with current medications. The baby iswell. Baby is feeding via breast. Urinary output is adequate. The patient is not ambulating well. The patient is tolerating a normal diet. Flatus has been passed. Denies dizziness, states does get short of breath easily just walking around room. Does state she feels \"poorly\" but denies being in a lot of pain from incision. Denies headache but does have dull sinus ache that is relieved when she cleans out her sinuses. Denies visual disturbances or epigastric pain. Pregnancy complicated by:   Patient Active Problem List   Diagnosis Code    Hypothyroidism E03.9    BMI 40.0-44.9, adult (HCC) Z68.41     (normal spontaneous vaginal delivery) O80    Term birth of male  Z37.0    Masses of both breasts N63.10, N63.20    35 weeks gestation of pregnancy Z3A.35     PAST OB HISTORY  OB History          2    Para   2    Term   1       1    AB        Living   3         SAB        IAB        Ectopic        Molar        Multiple   1    Live Births   3                Past Medical History:        Diagnosis Date    Hypothyroidism      Specifically no history of asthma. Past Surgical History:        Procedure Laterality Date     SECTION N/A 10/18/2023     SECTION performed by Hans Goode MD at [facility] L&D OR    FOOT SURGERY      left foot and leg stepped on a needle    US BREAST BIOPSY W LOC DEVICE 1ST LESION LEFT Left 2023    US BREAST BIOPSY W LOC DEVICE 1ST LESION LEFT 2023 [facility]     Specifically no history of anesthesia reactions or problems. No history of bleeding or trouble healing / recovering from surgery. Social History:    TOBACCO:   reports that she has never smoked.  She has never used smokeless tobacco.  ETOH:   reports no

## 2023-10-20 NOTE — FLOWSHEET NOTE
Patient admitted into room and oriented to surroundings. Introduced self and wrote this RN's name and phone extension on patient's white board. Phone and nurse's call light at patient's bedside and instructed to use for any needs. Patient instructed on new admission informational packet at bedside with information on infant testing to be done when infant is 24 hours old including [facility] labs, 24 hours blood sugar, and CCHD. Patient instructed on mom baby unit policies and procedures including need to keep infant in bassinet for transport in hallways and for infant to sleep alone, on back, in an empty bassinet. Patient also instructed patient on unit visitation policy and that one same support person 25years old or older may stay overnight if desired. Patient verbalized understanding of all of the above. TDAP and FLU vaccines given during pregnancy.

## 2023-10-20 NOTE — PROGRESS NOTES
Dr. Jayesh Cruz updated on BP's, orders obtained to discontinue MgSO4 and transfer patient to the floor.

## 2023-10-20 NOTE — FLOWSHEET NOTE
Nurse assisted patient up out of bed at this time, ambulated well with no dizziness. Gait is steady and patient tolerated well. Voiding adequate amount. Rachelle care done independently.

## 2023-10-21 VITALS
TEMPERATURE: 98.6 F | SYSTOLIC BLOOD PRESSURE: 129 MMHG | DIASTOLIC BLOOD PRESSURE: 77 MMHG | BODY MASS INDEX: 47.46 KG/M2 | RESPIRATION RATE: 18 BRPM | HEIGHT: 64 IN | HEART RATE: 74 BPM | OXYGEN SATURATION: 94 % | WEIGHT: 278 LBS

## 2023-10-21 PROCEDURE — 6370000000 HC RX 637 (ALT 250 FOR IP): Performed by: OBSTETRICS & GYNECOLOGY

## 2023-10-21 PROCEDURE — 6370000000 HC RX 637 (ALT 250 FOR IP): Performed by: ADVANCED PRACTICE MIDWIFE

## 2023-10-21 RX ORDER — IBUPROFEN 600 MG/1
600 TABLET ORAL EVERY 8 HOURS PRN
Qty: 21 TABLET | Refills: 0 | Status: SHIPPED | OUTPATIENT
Start: 2023-10-21

## 2023-10-21 RX ORDER — OXYCODONE HYDROCHLORIDE 5 MG/1
5 TABLET ORAL EVERY 6 HOURS PRN
Qty: 16 TABLET | Refills: 0 | Status: SHIPPED | OUTPATIENT
Start: 2023-10-21 | End: 2023-10-28

## 2023-10-21 RX ADMIN — FERROUS SULFATE TAB 325 MG (65 MG ELEMENTAL FE) 325 MG: 325 (65 FE) TAB at 08:28

## 2023-10-21 RX ADMIN — PANTOPRAZOLE SODIUM 40 MG: 40 TABLET, DELAYED RELEASE ORAL at 08:28

## 2023-10-21 RX ADMIN — LEVOTHYROXINE SODIUM 112 MCG: 0.11 TABLET ORAL at 08:29

## 2023-10-21 RX ADMIN — DOCUSATE SODIUM 100 MG: 100 CAPSULE, LIQUID FILLED ORAL at 08:29

## 2023-10-21 RX ADMIN — ACETAMINOPHEN 1000 MG: 500 TABLET ORAL at 06:40

## 2023-10-21 RX ADMIN — IBUPROFEN 600 MG: 600 TABLET ORAL at 08:29

## 2023-10-21 RX ADMIN — OXYCODONE HYDROCHLORIDE 5 MG: 5 TABLET ORAL at 08:29

## 2023-10-21 RX ADMIN — SIMETHICONE 80 MG: 80 TABLET, CHEWABLE ORAL at 08:29

## 2023-10-21 RX ADMIN — METFORMIN HYDROCHLORIDE 1 TABLET: 500 TABLET, EXTENDED RELEASE ORAL at 08:28

## 2023-10-21 ASSESSMENT — PAIN SCALES - GENERAL
PAINLEVEL_OUTOF10: 6
PAINLEVEL_OUTOF10: 1
PAINLEVEL_OUTOF10: 5

## 2023-10-21 ASSESSMENT — PAIN DESCRIPTION - PAIN TYPE: TYPE: ACUTE PAIN

## 2023-10-21 ASSESSMENT — PAIN DESCRIPTION - DESCRIPTORS: DESCRIPTORS: DISCOMFORT

## 2023-10-21 ASSESSMENT — PAIN DESCRIPTION - FREQUENCY: FREQUENCY: INTERMITTENT

## 2023-10-21 NOTE — DISCHARGE INSTRUCTIONS
Follow-up with your OB doctor in 1 week if  delivery or in  6 weeks for vaginal delivery unless otherwise instructed. Call office for an appointment. For breastfeeding support, you can contact our lactation specialists at [phone] or [phone]    DIET  Eat a well balanced diet focusing on foods high in fiber and protein  Drink plenty of fluids especially water. To avoid constipation you may take a mild stool softener as recommended by your doctor or midwife. ACTIVITY  Gradually increase your activity. Resume exercise regimen only after advised by your doctor or midwife. Avoid lifting anything heavier than your baby or a gallon of milk for SIX weeks. Avoid driving until your doctor or midwife has given their approval.  Terrall Linger slowly from a lying to sitting and then a standing position. Climb stairs one at a time. Use caution when carrying your baby up and down the stairs. No sexual activity for 6 weeks or until advised by your doctor - Nothing in vagina: intercourse, tampons, or douching. Be prepared to discuss family planning at your follow-up OB visit. You may feel tired or have a lack of energy. You may continue your prenatal vitamin to replenish nutrients post delivery. Nap when baby naps to catch up on sleep. May return to work or school in 6 weeks or as directed by OB. EMOTIONS  You may feed magana, sad, teary, & overwhelmed. Contact your OB provider if you feel you may be showing signs of postpartum depression, or have thoughts of harming yourself or your infant. If infant will not stop crying, contact another adult for help or place infant in their crib on their back and take a break. NEVER shake your infant. BLEEDING  Vaginal bleeding will decrease in amount over the next few weeks. You will notice that as your activity increases, your flow may increase. This is your body's way of telling you, you need to take things easier and rest more often.   Call your

## 2023-10-24 LAB — SURGICAL PATHOLOGY REPORT: NORMAL

## 2023-11-02 ENCOUNTER — POSTPARTUM VISIT (OUTPATIENT)
Age: 33
End: 2023-11-02

## 2023-11-02 VITALS
WEIGHT: 253 LBS | SYSTOLIC BLOOD PRESSURE: 136 MMHG | BODY MASS INDEX: 43.43 KG/M2 | DIASTOLIC BLOOD PRESSURE: 85 MMHG | HEART RATE: 91 BPM

## 2023-11-02 DIAGNOSIS — E03.9 ACQUIRED HYPOTHYROIDISM: Primary | ICD-10-CM

## 2023-11-02 DIAGNOSIS — Z09 POSTOP CHECK: Primary | ICD-10-CM

## 2023-11-02 PROBLEM — Z3A.35 35 WEEKS GESTATION OF PREGNANCY: Status: RESOLVED | Noted: 2023-10-18 | Resolved: 2023-11-02

## 2023-11-02 PROBLEM — N63.10 MASSES OF BOTH BREASTS: Status: RESOLVED | Noted: 2023-08-18 | Resolved: 2023-11-02

## 2023-11-02 PROBLEM — N63.20 MASSES OF BOTH BREASTS: Status: RESOLVED | Noted: 2023-08-18 | Resolved: 2023-11-02

## 2023-11-02 RX ORDER — LEVOTHYROXINE SODIUM 112 UG/1
112 TABLET ORAL DAILY
Qty: 30 TABLET | Refills: 0 | Status: SHIPPED | OUTPATIENT
Start: 2023-11-02

## 2023-11-02 RX ORDER — METOCLOPRAMIDE 10 MG/1
10 TABLET ORAL
Qty: 44 TABLET | Refills: 0 | Status: SHIPPED | OUTPATIENT
Start: 2023-11-02 | End: 2023-11-16

## 2023-11-02 NOTE — PROGRESS NOTES
Juan Allen is a 51-year-old G2, P4 female who underwent a primary LTCS on 10- at 35 weeks and 6 days for severe preeclampsia twin gestation. She is breast-feeding but reports decreased milk supply. She is still bleeding but minimally. She denies any signs or symptoms of postpartum depression but is fatigued. She does have good help at home. She is interested in contraception she is not sure exactly what yet she will discuss with her partner. She denies any dysuria urgency or frequency. She is not requiring anything for pain. She does report some discomfort when turning.     Patient presents for postop incision check    Past Medical History:   Diagnosis Date    Fibroadenoma of breast, left 2023    Biopsied    History of severe pre-eclampsia 10/18/2023    Twin gestation     Hypothyroidism         Past Surgical History:   Procedure Laterality Date     SECTION N/A 10/18/2023     SECTION performed by Amna Dias MD at [facility] L&D OR    FOOT SURGERY      left foot and leg stepped on a needle    US BREAST BIOPSY W LOC DEVICE 1ST LESION LEFT Left 2023    US BREAST BIOPSY W LOC DEVICE 1ST LESION LEFT 2023 [facility]        Family History   Problem Relation Age of Onset    Hemophilia Mother     Diabetes Father     Hemophilia Father     Cancer Maternal Grandmother     Breast Cancer Maternal Grandmother 36    Hypertension Maternal Grandfather     Diabetes Maternal Grandfather     Cancer Paternal Grandmother     Kidney Disease Paternal Grandfather                           Current Outpatient Medications:     levothyroxine (SYNTHROID) 112 MCG tablet, Take 1 tablet by mouth daily, Disp: 30 tablet, Rfl: 0    metoclopramide (REGLAN) 10 MG tablet, Take 1 tablet by mouth 3 times daily (with meals) for 14 days, Disp: 44 tablet, Rfl: 0    ferrous sulfate (IRON 325) 325 (65 Fe) MG tablet, Take 1 tablet by mouth every other day, Disp: , Rfl:     ibuprofen (ADVIL;MOTRIN)

## 2023-11-02 NOTE — PROGRESS NOTES
Patient is here today for incision check. Patient had  on 10/18. Has some pain sometimes under her belly button when she turns right or left.

## 2023-12-05 DIAGNOSIS — N63.20 MASSES OF BOTH BREASTS: Primary | ICD-10-CM

## 2023-12-05 DIAGNOSIS — N63.10 MASSES OF BOTH BREASTS: Primary | ICD-10-CM

## 2024-01-30 RX ORDER — ASPIRIN 81 MG/1
81 TABLET, COATED ORAL DAILY
Qty: 90 TABLET | Refills: 1 | Status: SHIPPED | OUTPATIENT
Start: 2024-01-30

## 2024-12-17 SDOH — ECONOMIC STABILITY: INCOME INSECURITY: HOW HARD IS IT FOR YOU TO PAY FOR THE VERY BASICS LIKE FOOD, HOUSING, MEDICAL CARE, AND HEATING?: NOT VERY HARD

## 2024-12-17 SDOH — ECONOMIC STABILITY: FOOD INSECURITY: WITHIN THE PAST 12 MONTHS, YOU WORRIED THAT YOUR FOOD WOULD RUN OUT BEFORE YOU GOT MONEY TO BUY MORE.: NEVER TRUE

## 2024-12-17 SDOH — ECONOMIC STABILITY: FOOD INSECURITY: WITHIN THE PAST 12 MONTHS, THE FOOD YOU BOUGHT JUST DIDN'T LAST AND YOU DIDN'T HAVE MONEY TO GET MORE.: NEVER TRUE

## 2024-12-17 SDOH — ECONOMIC STABILITY: TRANSPORTATION INSECURITY
IN THE PAST 12 MONTHS, HAS LACK OF TRANSPORTATION KEPT YOU FROM MEETINGS, WORK, OR FROM GETTING THINGS NEEDED FOR DAILY LIVING?: NO

## 2024-12-17 ASSESSMENT — PATIENT HEALTH QUESTIONNAIRE - PHQ9
SUM OF ALL RESPONSES TO PHQ QUESTIONS 1-9: 0
SUM OF ALL RESPONSES TO PHQ9 QUESTIONS 1 & 2: 0
2. FEELING DOWN, DEPRESSED OR HOPELESS: NOT AT ALL
SUM OF ALL RESPONSES TO PHQ QUESTIONS 1-9: 0
SUM OF ALL RESPONSES TO PHQ QUESTIONS 1-9: 0
1. LITTLE INTEREST OR PLEASURE IN DOING THINGS: NOT AT ALL
SUM OF ALL RESPONSES TO PHQ QUESTIONS 1-9: 0
SUM OF ALL RESPONSES TO PHQ9 QUESTIONS 1 & 2: 0
2. FEELING DOWN, DEPRESSED OR HOPELESS: NOT AT ALL
1. LITTLE INTEREST OR PLEASURE IN DOING THINGS: NOT AT ALL

## 2024-12-18 ENCOUNTER — OFFICE VISIT (OUTPATIENT)
Dept: INTERNAL MEDICINE | Age: 34
End: 2024-12-18
Payer: COMMERCIAL

## 2024-12-18 VITALS
DIASTOLIC BLOOD PRESSURE: 75 MMHG | BODY MASS INDEX: 48.32 KG/M2 | HEIGHT: 64 IN | OXYGEN SATURATION: 99 % | SYSTOLIC BLOOD PRESSURE: 140 MMHG | WEIGHT: 283 LBS | HEART RATE: 96 BPM | TEMPERATURE: 97.3 F | RESPIRATION RATE: 16 BRPM

## 2024-12-18 DIAGNOSIS — D50.9 IRON DEFICIENCY ANEMIA, UNSPECIFIED IRON DEFICIENCY ANEMIA TYPE: ICD-10-CM

## 2024-12-18 DIAGNOSIS — N63.0 LUMP IN FEMALE BREAST: ICD-10-CM

## 2024-12-18 DIAGNOSIS — E03.9 HYPOTHYROIDISM, UNSPECIFIED TYPE: Primary | ICD-10-CM

## 2024-12-18 DIAGNOSIS — E55.9 VITAMIN D DEFICIENCY: ICD-10-CM

## 2024-12-18 PROCEDURE — G8484 FLU IMMUNIZE NO ADMIN: HCPCS | Performed by: INTERNAL MEDICINE

## 2024-12-18 PROCEDURE — G8427 DOCREV CUR MEDS BY ELIG CLIN: HCPCS | Performed by: INTERNAL MEDICINE

## 2024-12-18 PROCEDURE — 99214 OFFICE O/P EST MOD 30 MIN: CPT | Performed by: INTERNAL MEDICINE

## 2024-12-18 PROCEDURE — 1036F TOBACCO NON-USER: CPT | Performed by: INTERNAL MEDICINE

## 2024-12-18 PROCEDURE — G8417 CALC BMI ABV UP PARAM F/U: HCPCS | Performed by: INTERNAL MEDICINE

## 2024-12-18 RX ORDER — LEVOTHYROXINE SODIUM 125 UG/1
125 TABLET ORAL DAILY
COMMUNITY

## 2024-12-24 NOTE — PROGRESS NOTES
[facility]  Internal Medicine faculty faculty note      CC: had concerns including Hypothyroidism (Routine check up).    HPI:Adair Howard has a PMHx of   Patient Active Problem List   Diagnosis    Hypothyroidism    BMI 40.0-44.9, adult     (normal spontaneous vaginal delivery)      presented to the [facility] for a routine visit   She was in [place]  Where she was found to be hypothyroid with a TSH of 9  Appropriate adjustment of thyroxine was performed    Data reviewed in detail      Health Care Maintenance:       Things to follow up:      ASSESSMENT/PLAN:  1. Hypothyroidism, unspecified type  Overview:  Lab Results   Component Value Date/Time    TSH 2.59 2023 01:10 PM    TSH 3.740 2023 01:01 PM    TSH 2.610 2021 04:50 PM   Continue levothyroxine 112, adjusting based on pregnancy    Orders:  -     TSH; Future  2. Iron deficiency anemia, unspecified iron deficiency anemia type  -     CBC with Auto Differential; Future  -     Ferritin; Future  3. Vitamin D deficiency  -     Vitamin D 25 Hydroxy; Future  4. Lump in female breast  -     US BREAST BILATERAL LIMITED; Future       SUBJECTIVE:    Review of System:  Constitutional:  Negative for appetite change, chills, fatigue, fever and unexpected weight change.   HENT:  Negative for sneezing and sore throat.    Eyes:  Negative for discharge and redness.   Respiratory:  Negative for cough, chest tightness, shortness of breath and wheezing.    Cardiovascular:  Negative for chest pain, palpitations and leg swelling.   Gastrointestinal:  Negative for N/V/C/D, abdominal distention, abdominal pain.   Genitourinary:  Negative for dysuria and increased frequency.   Musculoskeletal: Negative.    Skin: Negative.    Neurological:  Negative for dizziness, weakness, light-headedness and headaches.   Other:     I have reviewed all pertinent PMHx, PSHx, FamHx, SocialHx, medications, and allergies and updated history as

## 2025-01-02 DIAGNOSIS — Z13.9 SCREENING DUE: Primary | ICD-10-CM

## 2025-01-08 DIAGNOSIS — N63.0 MASS OF BREAST, UNSPECIFIED LATERALITY: Primary | ICD-10-CM

## 2025-01-28 ENCOUNTER — HOSPITAL ENCOUNTER (OUTPATIENT)
Age: 35
Discharge: HOME | End: 2025-01-28
Payer: COMMERCIAL

## 2025-01-28 DIAGNOSIS — E03.9 HYPOTHYROIDISM, UNSPECIFIED TYPE: ICD-10-CM

## 2025-01-28 DIAGNOSIS — E55.9 VITAMIN D DEFICIENCY: ICD-10-CM

## 2025-01-28 DIAGNOSIS — D50.9 IRON DEFICIENCY ANEMIA, UNSPECIFIED IRON DEFICIENCY ANEMIA TYPE: ICD-10-CM

## 2025-01-28 LAB
25(OH)D3 SERPL-MCNC: 13.1 NG/ML (ref 30–100)
BASOPHILS # BLD: 0.04 K/UL (ref 0–0.2)
BASOPHILS NFR BLD: 1 % (ref 0–2)
EOSINOPHIL # BLD: 0.11 K/UL (ref 0.05–0.5)
EOSINOPHILS RELATIVE PERCENT: 2 % (ref 0–6)
ERYTHROCYTE [DISTWIDTH] IN BLOOD BY AUTOMATED COUNT: 13.4 % (ref 11.5–15)
FERRITIN SERPL-MCNC: 27 NG/ML
HCT VFR BLD AUTO: 37.5 % (ref 34–48)
HGB BLD-MCNC: 11.8 G/DL (ref 11.5–15.5)
IMM GRANULOCYTES # BLD AUTO: <0.03 K/UL (ref 0–0.58)
IMM GRANULOCYTES NFR BLD: 0 % (ref 0–5)
LYMPHOCYTES NFR BLD: 1.97 K/UL (ref 1.5–4)
LYMPHOCYTES RELATIVE PERCENT: 31 % (ref 20–42)
MCH RBC QN AUTO: 25.4 PG (ref 26–35)
MCHC RBC AUTO-ENTMCNC: 31.5 G/DL (ref 32–34.5)
MCV RBC AUTO: 80.8 FL (ref 80–99.9)
MONOCYTES NFR BLD: 0.38 K/UL (ref 0.1–0.95)
MONOCYTES NFR BLD: 6 % (ref 2–12)
NEUTROPHILS NFR BLD: 60 % (ref 43–80)
NEUTS SEG NFR BLD: 3.8 K/UL (ref 1.8–7.3)
PLATELET # BLD AUTO: 209 K/UL (ref 130–450)
PMV BLD AUTO: 10.4 FL (ref 7–12)
RBC # BLD AUTO: 4.64 M/UL (ref 3.5–5.5)
TSH SERPL DL<=0.05 MIU/L-ACNC: 6.1 UIU/ML (ref 0.27–4.2)
WBC OTHER # BLD: 6.3 K/UL (ref 4.5–11.5)

## 2025-01-28 PROCEDURE — 82728 ASSAY OF FERRITIN: CPT

## 2025-01-28 PROCEDURE — 84443 ASSAY THYROID STIM HORMONE: CPT

## 2025-01-28 PROCEDURE — 82306 VITAMIN D 25 HYDROXY: CPT

## 2025-01-28 PROCEDURE — 36415 COLL VENOUS BLD VENIPUNCTURE: CPT

## 2025-01-28 PROCEDURE — 85025 COMPLETE CBC W/AUTO DIFF WBC: CPT

## 2025-01-28 RX ORDER — LEVOTHYROXINE SODIUM 125 UG/1
125 TABLET ORAL DAILY
Qty: 90 TABLET | Refills: 0 | Status: SHIPPED | OUTPATIENT
Start: 2025-01-28

## 2025-01-28 NOTE — TELEPHONE ENCOUNTER
Pt completed labs today and is requesting refill on sythroid and lab results.       Name of Medication(s) Requested:  Requested Prescriptions      No prescriptions requested or ordered in this encounter       Medication is on current medication list Yes    Dosage and directions were verified? Yes    Quantity verified: 90 day supply     Pharmacy Verified?  Yes    Last Appointment:  8/18/2023    Future appts:  Future Appointments   Date Time Provider Department Center   2/7/2025  8:15 AM [facility] RM 2 [facility] Rad/Car   2/7/2025  8:45 AM [facility] ABDU  RM 3 [facility]        (If no appt send self scheduling link. .REFILLAPPT)  Scheduling request sent?     [] Yes  [x] No    Does patient need updated?  [] Yes  [x] No

## 2025-01-29 ENCOUNTER — TELEPHONE (OUTPATIENT)
Dept: INTERNAL MEDICINE | Age: 35
End: 2025-01-29

## 2025-01-29 DIAGNOSIS — E55.9 VITAMIN D DEFICIENCY: Primary | ICD-10-CM

## 2025-01-29 RX ORDER — ERGOCALCIFEROL 1.25 MG/1
50000 CAPSULE, LIQUID FILLED ORAL WEEKLY
Qty: 6 CAPSULE | Refills: 0 | Status: SHIPPED | OUTPATIENT
Start: 2025-01-29

## 2025-01-29 NOTE — TELEPHONE ENCOUNTER
Called patient to discuss labwork. Noted to have TSH 6.10 and vitamin D 13.1. Will start vitamin D supplementation. Discussed TSH with Dr. Chávez who previously saw patient; as is [place] will continue current synthroid dose and recheck in 3 months.     Electronically signed by Angela Allen MD on 1/29/2025 at 11:14 AM

## 2025-02-07 ENCOUNTER — HOSPITAL ENCOUNTER (OUTPATIENT)
Dept: GENERAL RADIOLOGY | Age: 35
Discharge: HOME | End: 2025-02-09
Payer: COMMERCIAL

## 2025-02-07 VITALS — WEIGHT: 280 LBS | HEIGHT: 63 IN | BODY MASS INDEX: 49.61 KG/M2

## 2025-02-07 DIAGNOSIS — N63.0 MASS OF BREAST, UNSPECIFIED LATERALITY: ICD-10-CM

## 2025-02-07 DIAGNOSIS — N63.0 LUMP IN FEMALE BREAST: ICD-10-CM

## 2025-02-07 DIAGNOSIS — N63.0 MASS OF BREAST, UNSPECIFIED LATERALITY: Primary | ICD-10-CM

## 2025-02-07 PROCEDURE — G0279 TOMOSYNTHESIS, MAMMO: HCPCS

## 2025-02-07 PROCEDURE — 76642 ULTRASOUND BREAST LIMITED: CPT

## 2025-03-30 ENCOUNTER — HOSPITAL ENCOUNTER (EMERGENCY)
Age: 35
Discharge: HOME | End: 2025-03-30
Attending: EMERGENCY MEDICINE
Payer: COMMERCIAL

## 2025-03-30 ENCOUNTER — APPOINTMENT (OUTPATIENT)
Dept: GENERAL RADIOLOGY | Age: 35
End: 2025-03-30
Payer: COMMERCIAL

## 2025-03-30 VITALS
WEIGHT: 284 LBS | RESPIRATION RATE: 18 BRPM | SYSTOLIC BLOOD PRESSURE: 151 MMHG | TEMPERATURE: 97.4 F | BODY MASS INDEX: 50.31 KG/M2 | OXYGEN SATURATION: 100 % | HEART RATE: 89 BPM | DIASTOLIC BLOOD PRESSURE: 87 MMHG

## 2025-03-30 DIAGNOSIS — R06.00 DYSPNEA, UNSPECIFIED TYPE: Primary | ICD-10-CM

## 2025-03-30 LAB
ALBUMIN SERPL-MCNC: 4.6 G/DL (ref 3.5–5.2)
ALP SERPL-CCNC: 96 U/L (ref 35–104)
ALT SERPL-CCNC: 15 U/L (ref 0–32)
ANION GAP SERPL CALCULATED.3IONS-SCNC: 16 MMOL/L (ref 7–16)
AST SERPL-CCNC: 23 U/L (ref 0–31)
BASOPHILS # BLD: 0.05 K/UL (ref 0–0.2)
BASOPHILS NFR BLD: 1 % (ref 0–2)
BILIRUB SERPL-MCNC: 0.3 MG/DL (ref 0–1.2)
BNP SERPL-MCNC: 132 PG/ML (ref 0–125)
BUN SERPL-MCNC: 14 MG/DL (ref 6–20)
CALCIUM SERPL-MCNC: 9.3 MG/DL (ref 8.6–10.2)
CHLORIDE SERPL-SCNC: 100 MMOL/L (ref 98–107)
CO2 SERPL-SCNC: 20 MMOL/L (ref 22–29)
CREAT SERPL-MCNC: 0.6 MG/DL (ref 0.5–1)
D-DIMER QUANTITATIVE: <200 NG/ML DDU (ref 0–230)
EOSINOPHIL # BLD: 0.13 K/UL (ref 0.05–0.5)
EOSINOPHILS RELATIVE PERCENT: 2 % (ref 0–6)
ERYTHROCYTE [DISTWIDTH] IN BLOOD BY AUTOMATED COUNT: 14.1 % (ref 11.5–15)
GFR, ESTIMATED: >90 ML/MIN/1.73M2
GLUCOSE SERPL-MCNC: 115 MG/DL (ref 74–99)
HCG, URINE, POC: NEGATIVE
HCT VFR BLD AUTO: 40.1 % (ref 34–48)
HGB BLD-MCNC: 13 G/DL (ref 11.5–15.5)
IMM GRANULOCYTES # BLD AUTO: <0.03 K/UL (ref 0–0.58)
IMM GRANULOCYTES NFR BLD: 0 % (ref 0–5)
LYMPHOCYTES NFR BLD: 2.73 K/UL (ref 1.5–4)
LYMPHOCYTES RELATIVE PERCENT: 32 % (ref 20–42)
Lab: NORMAL
MCH RBC QN AUTO: 25.8 PG (ref 26–35)
MCHC RBC AUTO-ENTMCNC: 32.4 G/DL (ref 32–34.5)
MCV RBC AUTO: 79.7 FL (ref 80–99.9)
MONOCYTES NFR BLD: 0.56 K/UL (ref 0.1–0.95)
MONOCYTES NFR BLD: 7 % (ref 2–12)
NEGATIVE QC PASS/FAIL: NORMAL
NEUTROPHILS NFR BLD: 60 % (ref 43–80)
NEUTS SEG NFR BLD: 5.16 K/UL (ref 1.8–7.3)
PLATELET # BLD AUTO: 271 K/UL (ref 130–450)
PMV BLD AUTO: 10.1 FL (ref 7–12)
POSITIVE QC PASS/FAIL: NORMAL
POTASSIUM SERPL-SCNC: 3.5 MMOL/L (ref 3.5–5)
PROT SERPL-MCNC: 8.4 G/DL (ref 6.4–8.3)
RBC # BLD AUTO: 5.03 M/UL (ref 3.5–5.5)
SODIUM SERPL-SCNC: 136 MMOL/L (ref 132–146)
TROPONIN I SERPL HS-MCNC: <6 NG/L (ref 0–9)
WBC OTHER # BLD: 8.7 K/UL (ref 4.5–11.5)

## 2025-03-30 PROCEDURE — 80053 COMPREHEN METABOLIC PANEL: CPT

## 2025-03-30 PROCEDURE — 83880 ASSAY OF NATRIURETIC PEPTIDE: CPT

## 2025-03-30 PROCEDURE — 84484 ASSAY OF TROPONIN QUANT: CPT

## 2025-03-30 PROCEDURE — 85379 FIBRIN DEGRADATION QUANT: CPT

## 2025-03-30 PROCEDURE — 85025 COMPLETE CBC W/AUTO DIFF WBC: CPT

## 2025-03-30 PROCEDURE — 93005 ELECTROCARDIOGRAM TRACING: CPT

## 2025-03-30 PROCEDURE — 99285 EMERGENCY DEPT VISIT HI MDM: CPT

## 2025-03-30 PROCEDURE — 71046 X-RAY EXAM CHEST 2 VIEWS: CPT

## 2025-03-30 RX ORDER — 0.9 % SODIUM CHLORIDE 0.9 %
1000 INTRAVENOUS SOLUTION INTRAVENOUS ONCE
Status: DISCONTINUED | OUTPATIENT
Start: 2025-03-30 | End: 2025-03-30

## 2025-03-30 ASSESSMENT — PAIN - FUNCTIONAL ASSESSMENT
PAIN_FUNCTIONAL_ASSESSMENT: NONE - DENIES PAIN
PAIN_FUNCTIONAL_ASSESSMENT: NONE - DENIES PAIN

## 2025-03-31 LAB
EKG ATRIAL RATE: 104 BPM
EKG P AXIS: 65 DEGREES
EKG P-R INTERVAL: 162 MS
EKG Q-T INTERVAL: 346 MS
EKG QRS DURATION: 96 MS
EKG QTC CALCULATION (BAZETT): 454 MS
EKG R AXIS: 0 DEGREES
EKG T AXIS: 37 DEGREES
EKG VENTRICULAR RATE: 104 BPM

## 2025-03-31 PROCEDURE — 93010 ELECTROCARDIOGRAM REPORT: CPT | Performed by: INTERNAL MEDICINE

## 2025-03-31 NOTE — ED NOTES
A/ox4, on RA, up x1 SBA with rolling walker, NPO, NGT in place to low-intermittent suction with liquid green bile output, denies nausea, states that pain is improved and is tolerable, IVF infusing overnight, vital signs stable, no acute changes overnight. Call light within reach, safety measures in place, frequent rounding done, plan of care continued.        Problem: PAIN - ADULT  Goal: Verbalizes/displays adequate comfort level or patient's stated pain goal  Description: INTERVENTIONS:  - Encourage pt to monitor pain and request assistance  - Assess pain using appropriate pain scale  - Administer analgesics based on type and severity of pain and evaluate response  - Implement non-pharmacological measures as appropriate and evaluate response  - Consider cultural and social influences on pain and pain management  - Manage/alleviate anxiety  - Utilize distraction and/or relaxation techniques  - Monitor for opioid side effects  - Notify MD/LIP if interventions unsuccessful or patient reports new pain  - Anticipate increased pain with activity and pre-medicate as appropriate  Outcome: Progressing     Problem: SAFETY ADULT - FALL  Goal: Free from fall injury  Description: INTERVENTIONS:  - Assess pt frequently for physical needs  - Identify cognitive and physical deficits and behaviors that affect risk of falls.  - [place] fall precautions as indicated by assessment.  - Educate pt/family on patient safety including physical limitations  - Instruct pt to call for assistance with activity based on assessment  - Modify environment to reduce risk of injury  - Provide assistive devices as appropriate  - Consider OT/PT consult to assist with strengthening/mobility  - Encourage toileting schedule  Outcome: Progressing     Problem: GASTROINTESTINAL - ADULT  Goal: Minimal or absence of nausea and vomiting  Description: INTERVENTIONS:  - Maintain adequate hydration with IV or PO as ordered and tolerated  - Nasogastric tube to  Patient is 100% on RA while walking from the waiting room to Protocol.   low intermittent suction as ordered  - Evaluate effectiveness of ordered antiemetic medications  - Provide nonpharmacologic comfort measures as appropriate  - Advance diet as tolerated, if ordered  - Obtain nutritional consult as needed  - Evaluate fluid balance  Outcome: Progressing  Goal: Maintains or returns to baseline bowel function  Description: INTERVENTIONS:  - Assess bowel function  - Maintain adequate hydration with IV or PO as ordered and tolerated  - Evaluate effectiveness of GI medications  - Encourage mobilization and activity  - Obtain nutritional consult as needed  - Establish a toileting routine/schedule  - Consider collaborating with pharmacy to review patient's medication profile  Outcome: Progressing     Problem: SKIN/TISSUE INTEGRITY - ADULT  Goal: Skin integrity remains intact  Description: INTERVENTIONS  - Assess and document risk factors for pressure ulcer development  - Assess and document skin integrity  - Monitor for areas of redness and/or skin breakdown  - Initiate interventions, skin care algorithm/standards of care as needed  Outcome: Progressing  Goal: Incision(s), wounds(s) or drain site(s) healing without S/S of infection  Description: INTERVENTIONS:  - Assess and document risk factors for pressure ulcer development  - Assess and document skin integrity  - Assess and document dressing/incision, wound bed, drain sites and surrounding tissue  - Implement wound care per orders  - Initiate isolation precautions as appropriate  - Initiate Pressure Ulcer prevention bundle as indicated  Outcome: Progressing

## 2025-03-31 NOTE — ED PROVIDER NOTES
[facility] EMERGENCY DEPARTMENT  EMERGENCY DEPARTMENT ENCOUNTER        Pt Name: Adair Howard  MRN: [id number]  Birthdate 1990  Date of evaluation: 3/30/2025  Provider: Vasiliy Bravo DO  PCP: Vijay Infante MD  Note Started: 8:14 PM EDT 3/30/25    CHIEF COMPLAINT       Chief Complaint   Patient presents with    Shortness of Breath     Pt complaint of SOB tat started 2 days ago, Pt states it feels like it is difficult to take a deep breat.       HISTORY OF PRESENT ILLNESS: 1 or more Elements   History From: Patient    Adair Howard is a 34 y.o. female with a PMHx of hypothyroidism who presents with shortness of breath.  Patient states this has happened before but has worsened over the last 2 to 3 days, states she feels like she cannot take a full deep breath.  She denies any chest pain.  She denies any fevers, chills, cough, congestion, rhinorrhea, abdominal pain, nausea, vomiting, diarrhea, constipation, urinary symptoms.  Patient denies any chance of pregnancy.  Denies any vaginal bleeding.  Patient is a never smoker, never alcohol user, never drug user.  Patient did recently moved here 3 months ago of the country approximately an 18-hour flight.  She denies any current lower extremity swelling or tenderness, recent surgeries, hemoptysis, history of blood clots, use of anticoagulants.      Nursing Notes were all reviewed and agreed with or any disagreements were addressed in the HPI.      REVIEW OF EXTERNAL NOTES :       PDMP Monitoring:    Last PDMP Vasiliy as Reviewed:  Review User Review Instant Review Result            Urine Drug Screenings (1 yr)       Drug Screen Multi Urine With Bup  Collected: 6/16/2023 12:05 PM (Final result)              URINE DRUG SCREEN  Collected: 10/14/2022 10:40 PM (Final result)                  Medication Contract and Consent for Opioid Use Documents Filed        No documents found                      REVIEW OF SYSTEMS :      Positives

## 2025-03-31 NOTE — DISCHARGE INSTRUCTIONS
Please follow-up with primary care physician, return to the emergency department for any worsening symptoms or concerns including chest pain and shortness of breath as well as fevers and chills or any other general concerns

## 2025-04-28 RX ORDER — LEVOTHYROXINE SODIUM 125 UG/1
125 TABLET ORAL DAILY
Qty: 90 TABLET | Refills: 0 | Status: SHIPPED | OUTPATIENT
Start: 2025-04-28

## 2025-04-28 NOTE — TELEPHONE ENCOUNTER
Name of Medication(s) Requested:  Requested Prescriptions     Pending Prescriptions Disp Refills    levothyroxine (SYNTHROID) 125 MCG tablet [Pharmacy Med Name: LEVOTHYROXINE 125 MCG TABLET] 90 tablet 0     Sig: TAKE 1 TABLET BY MOUTH EVERY DAY       Medication is on current medication list Yes    Dosage and directions were verified? Yes    Quantity verified: 90 day supply     Pharmacy Verified?  Yes    Last Appointment:  Visit date not found    Future appts:  Future Appointments   Date Time Provider Department Center   2/9/2026  9:15 AM SEYZ MATT [facility] RM 1 [facility]   2/9/2026  9:45 AM SEYZ MATT  RM 3 [facility] Rad/Car        (If no appt send self scheduling link. .REFILLAPPT)  Scheduling request sent?     [] Yes  [x] No    Does patient need updated?  [] Yes  [x] No

## 2025-05-28 SDOH — ECONOMIC STABILITY: FOOD INSECURITY: WITHIN THE PAST 12 MONTHS, YOU WORRIED THAT YOUR FOOD WOULD RUN OUT BEFORE YOU GOT MONEY TO BUY MORE.: NEVER TRUE

## 2025-05-28 SDOH — ECONOMIC STABILITY: FOOD INSECURITY: WITHIN THE PAST 12 MONTHS, THE FOOD YOU BOUGHT JUST DIDN'T LAST AND YOU DIDN'T HAVE MONEY TO GET MORE.: NEVER TRUE

## 2025-05-28 SDOH — ECONOMIC STABILITY: INCOME INSECURITY: IN THE LAST 12 MONTHS, WAS THERE A TIME WHEN YOU WERE NOT ABLE TO PAY THE MORTGAGE OR RENT ON TIME?: NO

## 2025-05-28 SDOH — ECONOMIC STABILITY: TRANSPORTATION INSECURITY
IN THE PAST 12 MONTHS, HAS THE LACK OF TRANSPORTATION KEPT YOU FROM MEDICAL APPOINTMENTS OR FROM GETTING MEDICATIONS?: NO

## 2025-05-28 ASSESSMENT — PATIENT HEALTH QUESTIONNAIRE - PHQ9
SUM OF ALL RESPONSES TO PHQ QUESTIONS 1-9: 0
SUM OF ALL RESPONSES TO PHQ QUESTIONS 1-9: 0
2. FEELING DOWN, DEPRESSED OR HOPELESS: NOT AT ALL
1. LITTLE INTEREST OR PLEASURE IN DOING THINGS: NOT AT ALL
2. FEELING DOWN, DEPRESSED OR HOPELESS: NOT AT ALL
1. LITTLE INTEREST OR PLEASURE IN DOING THINGS: NOT AT ALL
SUM OF ALL RESPONSES TO PHQ QUESTIONS 1-9: 0
SUM OF ALL RESPONSES TO PHQ QUESTIONS 1-9: 0
SUM OF ALL RESPONSES TO PHQ9 QUESTIONS 1 & 2: 0

## 2025-05-29 ENCOUNTER — OFFICE VISIT (OUTPATIENT)
Age: 35
End: 2025-05-29
Payer: COMMERCIAL

## 2025-05-29 VITALS
WEIGHT: 293 LBS | SYSTOLIC BLOOD PRESSURE: 140 MMHG | RESPIRATION RATE: 16 BRPM | HEART RATE: 98 BPM | TEMPERATURE: 97.8 F | HEIGHT: 63 IN | BODY MASS INDEX: 51.91 KG/M2 | DIASTOLIC BLOOD PRESSURE: 84 MMHG | OXYGEN SATURATION: 99 %

## 2025-05-29 DIAGNOSIS — E03.9 HYPOTHYROIDISM, UNSPECIFIED TYPE: ICD-10-CM

## 2025-05-29 DIAGNOSIS — E66.813 CLASS 3 SEVERE OBESITY WITH BODY MASS INDEX (BMI) OF 45.0 TO 49.9 IN ADULT, UNSPECIFIED OBESITY TYPE, UNSPECIFIED WHETHER SERIOUS COMORBIDITY PRESENT (HCC): Primary | ICD-10-CM

## 2025-05-29 PROCEDURE — 1036F TOBACCO NON-USER: CPT

## 2025-05-29 PROCEDURE — 99214 OFFICE O/P EST MOD 30 MIN: CPT

## 2025-05-29 PROCEDURE — 99213 OFFICE O/P EST LOW 20 MIN: CPT

## 2025-05-29 PROCEDURE — G8417 CALC BMI ABV UP PARAM F/U: HCPCS

## 2025-05-29 PROCEDURE — G8427 DOCREV CUR MEDS BY ELIG CLIN: HCPCS

## 2025-05-29 RX ORDER — LEVOTHYROXINE SODIUM 125 UG/1
125 TABLET ORAL DAILY
Qty: 90 TABLET | Refills: 0 | Status: SHIPPED | OUTPATIENT
Start: 2025-05-29

## 2025-05-29 NOTE — PROGRESS NOTES
[facility]  Internal Medicine Residency Clinic    Attending Physician Statement  I have seen/discussed the case, including pertinent history and exam findings with the resident physician.  I agree with the assessment, plan and orders as documented by the resident. I have reviewed the relevant PMHx, PSHx, FamHx, SocialHx, medications, and allergies and updated history as appropriate.    Patient presents for routine follow up of medical problems.     Vertigo, resolved   Suspected BPPV, occurred with movement of head    KATHERINE   Hgb stable    Elevated BNP   Certainly at risk for HFpEF   Clinically asymptomatic and counseled weight loss    Obesity class 3, BMI 51.9  Recent addition 20 lbs and attributes to sugary food cravings    Recommend referral to surgical weight loss clinic     Hypothyroidism   Continues on levothyroxine   Recommend recheck TSH / free T4    Vit D def  Completed 7 weeks bolus dosing, recheck level and dose per; recommend 2000 units daily OTC    Remainder of medical problems as per resident note.    Mainor Cruz,   5/29/2025 3:45 PM

## 2025-05-29 NOTE — PROGRESS NOTES
[facility]  Internal Medicine Residency Program  ACC Note      SUBJECTIVE:  CC: had concerns including Dizziness (Per pt when she goes to lay down on pillow and move her head side to side she at times gets dizzy, not always and does go away for a cpl days at a time) and Leg Swelling (Jomar leg swelling, states left can be worse than right, does wear compression stockings and it does help).  HPI:Adair Howard presented to the [facility] for a routine visit   This is a 35-year old female patient with a past medical history of obesity, hypothyroidism, KATHERINE, and hypertension who presents to the clinic today for regular follow-up. Patient states that her thyroid function test was checked last time she was here, and TSH was 6. She has not seen remarkable improvement with 125 levothyroxine. She adds that she had low vitamin D level, and had received 7 pills 31811 unit vitamin D weakly for 7 week, but has not continued vitamin D since then. She endorses having black vision and feeling dizzy when she rotates her head, the last time happened last week and id lasted only for a couple seconds. She denied passing out, having SOB or palpitations.     She endorses struggling with weight gain, and difficulty losing weight. She has gained 20 pounds over the last couple months, and believes having sugar craving.     Review Of Systems:  General: no fevers, chills, weight loss or gain.   Ears/Nose/Throat: no hearing loss, tinnitus, vertigo, nosebleed, nasal congestion, rhinorrhea, sore throat  Respiratory: no cough, pleuritic chest pain, dyspnea, or wheezing  Cardiovascular: no chest pain, angina, dyspnea on exertion, orthopnea, PND, palpitations, or claudication  Gastrointestinal: no nausea, vomiting, heartburn, diarrhea, constipation, abdominal pain, hematochezia or melena  Genitourinary: no urinary urgency, frequency, dysuria, nocturia, hesitancy, or incontinence  Musculoskeletal: no arthritis, arthralgia, myalgia,

## 2025-05-29 NOTE — PATIENT INSTRUCTIONS
Dear Adair Howard,    Thank you for coming to your appointment at [facility].    Please make sure to do the following:    - Continue medications as listed and contact our office if medications are unavailable at the pharmacy.    - If lab work was ordered please complete as instructed.    - If a referral was placed to another doctor's office, please contact our office in 5 business days if you have not heard from that office regarding the referral.    - If any imaging test was ordered at today's visit (ultrasound, CT scan, or MRI), expect a phone call to schedule in the next 5 business days. You may also call [facility] Imaging Scheduling department at [phone] to schedule.    Contact our office if you develop any new or worsening symptoms or if you have any questions regarding today's visit.    We aim to address your healthcare needs to your satisfaction!    Sincerely,  Efren Naik MD  5/29/2025  4:03 PM

## 2025-07-15 ENCOUNTER — OFFICE VISIT (OUTPATIENT)
Age: 35
End: 2025-07-15
Payer: COMMERCIAL

## 2025-07-15 VITALS
TEMPERATURE: 97.3 F | HEIGHT: 63 IN | WEIGHT: 293 LBS | HEART RATE: 91 BPM | DIASTOLIC BLOOD PRESSURE: 81 MMHG | BODY MASS INDEX: 51.91 KG/M2 | SYSTOLIC BLOOD PRESSURE: 135 MMHG

## 2025-07-15 DIAGNOSIS — R53.82 CHRONIC FATIGUE: Primary | ICD-10-CM

## 2025-07-15 DIAGNOSIS — E66.813 CLASS 3 SEVERE OBESITY DUE TO EXCESS CALORIES WITHOUT SERIOUS COMORBIDITY WITH BODY MASS INDEX (BMI) OF 50.0 TO 59.9 IN ADULT (HCC): ICD-10-CM

## 2025-07-15 PROCEDURE — 99214 OFFICE O/P EST MOD 30 MIN: CPT | Performed by: INTERNAL MEDICINE

## 2025-07-15 PROCEDURE — G8417 CALC BMI ABV UP PARAM F/U: HCPCS | Performed by: INTERNAL MEDICINE

## 2025-07-15 PROCEDURE — 99213 OFFICE O/P EST LOW 20 MIN: CPT | Performed by: INTERNAL MEDICINE

## 2025-07-15 PROCEDURE — 1036F TOBACCO NON-USER: CPT | Performed by: INTERNAL MEDICINE

## 2025-07-15 PROCEDURE — G8427 DOCREV CUR MEDS BY ELIG CLIN: HCPCS | Performed by: INTERNAL MEDICINE

## 2025-07-15 RX ORDER — BUPROPION HYDROCHLORIDE 150 MG/1
TABLET ORAL
Qty: 30 TABLET | Refills: 0 | Status: SHIPPED | OUTPATIENT
Start: 2025-07-15

## 2025-07-15 NOTE — PATIENT INSTRUCTIONS
calorie intake to 1500 Calories/day  Walk 30 minutes daily  Avoid eating 2 hours within bedtime.     Tips:  Do not eat outside of the dining room or the kitchen  Do not eat while watching TV, videos, working on the computer or using a smart phone  Do not eat food out of a multi-serving bag or container.  Establish 6 hours of food-free \"time-out\" periods (times you don't eat) each day. No period can be less than 1 hour long. The periods need to be the same every day for days that are the same (for example, workdays would have one set of food free periods and weekends would have another set of days). These six hours are in addition to the two hours before bedtime and the time spent sleeping.    Medications:  Take Bupropion XL (Wellbutrin XL) 150 mg, one tablet every morning; if appetite suppression is insufficient after two weeks, then stop it  While taking it, check the BP twice each day for one week. If the systolic BP is >155 mmHg or the diastolic BP is >90 mmHg consistently, then stop taking it.

## 2025-07-15 NOTE — PROGRESS NOTES
Sometimes they give verification for their claims to be GMO and gluten free and to be organic. However, even such verifications as these may still be untrustworthy.)  Make sure fiber intake is at least 22 grams/day. Do this in part or whole by taking 12 tablespoons of [employer] Fiber One original, plain cereal (or [employer] All Bran Buds cereal) or 4 tablespoons of wheat dextrin powder (Benefiber or generic brand). For both of these, start with 1/8th - 1/4th the target amount and every week add another 1/8th - 1/4th until reaching the target).  Also, fiber gummies containing inulin (such as Nature Made, Eli, Benefiber) or Fiber Choice Pre-biotic tablets containing inulin are options. 1 cup of beans (not green beans, barbeque beans, baked beans or pork and beans) or peas is an excellent food source of fiber. Fruit is also a good source of fiber, especially blackberries, raspberries and strawberries. Low calorie, high fiber bread products containing modified wheat starch can be used. An example is the [employer] Xtreme Wellness High Fiber Carb Lean tortilla (7grams in 30 calories).  All of these fiber supplements are for the health of the colon. Their purpose is not to prevent or treat constipation.  Drink at least 64 oz of water each day  Take one multivitamin every day    Targets:  Limit calorie intake to 1500 Calories/day  Walk 30 minutes daily  Avoid eating 2 hours within bedtime.     Tips:  Do not eat outside of the dining room or the kitchen  Do not eat while watching TV, videos, working on the computer or using a smart phone  Do not eat food out of a multi-serving bag or container.  Establish 6 hours of food-free \"time-out\" periods (times you don't eat) each day. No period can be less than 1 hour long. The periods need to be the same every day for days that are the same (for example, workdays would have one set of food free periods and weekends would have another set of days). These six hours

## 2025-08-05 ENCOUNTER — HOSPITAL ENCOUNTER (OUTPATIENT)
Dept: LAB | Age: 35
Discharge: HOME | End: 2025-08-05
Payer: COMMERCIAL

## 2025-08-05 DIAGNOSIS — E66.813 CLASS 3 SEVERE OBESITY WITH BODY MASS INDEX (BMI) OF 45.0 TO 49.9 IN ADULT, UNSPECIFIED OBESITY TYPE, UNSPECIFIED WHETHER SERIOUS COMORBIDITY PRESENT (HCC): ICD-10-CM

## 2025-08-05 DIAGNOSIS — E03.9 HYPOTHYROIDISM, UNSPECIFIED TYPE: ICD-10-CM

## 2025-08-05 LAB
T4 FREE SERPL-MCNC: 1.6 NG/DL (ref 0.9–1.7)
TSH SERPL DL<=0.05 MIU/L-ACNC: 3.12 UIU/ML (ref 0.27–4.2)

## 2025-08-05 PROCEDURE — 84439 ASSAY OF FREE THYROXINE: CPT

## 2025-08-05 PROCEDURE — 36415 COLL VENOUS BLD VENIPUNCTURE: CPT

## 2025-08-05 PROCEDURE — 84443 ASSAY THYROID STIM HORMONE: CPT

## 2025-08-12 ENCOUNTER — OFFICE VISIT (OUTPATIENT)
Age: 35
End: 2025-08-12
Payer: COMMERCIAL

## 2025-08-12 VITALS
BODY MASS INDEX: 51.91 KG/M2 | DIASTOLIC BLOOD PRESSURE: 88 MMHG | SYSTOLIC BLOOD PRESSURE: 139 MMHG | RESPIRATION RATE: 14 BRPM | OXYGEN SATURATION: 98 % | HEART RATE: 90 BPM | TEMPERATURE: 97.2 F | WEIGHT: 293 LBS | HEIGHT: 63 IN

## 2025-08-12 DIAGNOSIS — E66.813 CLASS 3 SEVERE OBESITY DUE TO EXCESS CALORIES WITHOUT SERIOUS COMORBIDITY WITH BODY MASS INDEX (BMI) OF 50.0 TO 59.9 IN ADULT (HCC): ICD-10-CM

## 2025-08-12 DIAGNOSIS — E03.9 HYPOTHYROIDISM, UNSPECIFIED TYPE: Primary | ICD-10-CM

## 2025-08-12 DIAGNOSIS — I89.0 LYMPHEDEMA OF BOTH LOWER EXTREMITIES: ICD-10-CM

## 2025-08-12 DIAGNOSIS — N63.0 LUMP IN FEMALE BREAST: ICD-10-CM

## 2025-08-12 DIAGNOSIS — Z13.1 SCREENING FOR DIABETES MELLITUS: ICD-10-CM

## 2025-08-12 DIAGNOSIS — E55.9 VITAMIN D DEFICIENCY: ICD-10-CM

## 2025-08-12 PROCEDURE — 99213 OFFICE O/P EST LOW 20 MIN: CPT | Performed by: INTERNAL MEDICINE

## 2025-08-12 PROCEDURE — G8417 CALC BMI ABV UP PARAM F/U: HCPCS | Performed by: INTERNAL MEDICINE

## 2025-08-12 PROCEDURE — G8427 DOCREV CUR MEDS BY ELIG CLIN: HCPCS | Performed by: INTERNAL MEDICINE

## 2025-08-12 PROCEDURE — 1036F TOBACCO NON-USER: CPT | Performed by: INTERNAL MEDICINE

## 2025-08-12 PROCEDURE — 99212 OFFICE O/P EST SF 10 MIN: CPT

## 2025-08-12 RX ORDER — LEVOTHYROXINE SODIUM 125 UG/1
125 TABLET ORAL DAILY
Qty: 90 TABLET | Refills: 1 | Status: SHIPPED | OUTPATIENT
Start: 2025-08-12

## 2025-08-12 ASSESSMENT — ENCOUNTER SYMPTOMS
SHORTNESS OF BREATH: 0
CHEST TIGHTNESS: 0
DIARRHEA: 0
VOMITING: 0
ABDOMINAL PAIN: 0
CONSTIPATION: 0
BLOOD IN STOOL: 0
NAUSEA: 1

## 2025-08-12 ASSESSMENT — LIFESTYLE VARIABLES
HOW MANY STANDARD DRINKS CONTAINING ALCOHOL DO YOU HAVE ON A TYPICAL DAY: PATIENT DOES NOT DRINK
HOW OFTEN DO YOU HAVE A DRINK CONTAINING ALCOHOL: NEVER

## (undated) DEVICE — SUTURE STRATAFIX SPRL SZ 1 L14IN ABSRB VLT L48CM CTX 1/2 SXPD2B405

## (undated) DEVICE — CESAREAN BIRTH PACK: Brand: MEDLINE INDUSTRIES, INC.

## (undated) DEVICE — 34" SINGLE PATIENT USE HOVERMATT BREATHABLE: Brand: SINGLE PATIENT USE HOVERMATT

## (undated) DEVICE — 1LYRTR 16FR10ML 100%SILI SNAP: Brand: MEDLINE INDUSTRIES, INC.

## (undated) DEVICE — VACUETTE® TUBE 6 ML Z SERUM CLOT ACTIVATOR 13X100 RED CAP-BLACK RING, NON-RIDGED: Brand: VACUETTE

## (undated) DEVICE — GLOVE SURG SZ 7 L12IN FNGR THK79MIL GRN LTX FREE

## (undated) DEVICE — CONTAINER SPEC 64OZ POLYPR PATH SNAP LOK CAP W/ LID

## (undated) DEVICE — STAPLER SKIN SQ 30 ABSRB STPL DISP INSORB ORDER VIA PHONE OR EMAIL

## (undated) DEVICE — Device: Brand: PORTEX

## (undated) DEVICE — HYPODERMIC SAFETY NEEDLE: Brand: MAGELLAN

## (undated) DEVICE — SUTURE CHROMIC GUT SZ 1 L36IN ABSRB BRN L40MM CT 1/2 CIR 915H

## (undated) DEVICE — SUTURE PLN GUT SZ 3-0 L27IN ABSRB YELLOWISH TAN L36MM CT-1 842H

## (undated) DEVICE — GLOVE ORANGE PI 7   MSG9070

## (undated) DEVICE — CONTAINER,SPEC,PNEUM TUBE,3OZ,STRL PATH: Brand: MEDLINE

## (undated) DEVICE — BAG SPECIMEN BIOHAZARD 6IN X 9IN

## (undated) DEVICE — GLOVE SURG SZ 65 THK91MIL LTX FREE SYN POLYISOPRENE

## (undated) DEVICE — APPLICATOR PREP 26ML 0.7% IOD POVACRYLEX 74% ISO ALC ST

## (undated) DEVICE — SYRINGE MED 10ML LUERLOCK TIP W/O SFTY DISP

## (undated) DEVICE — 4-PORT MANIFOLD: Brand: NEPTUNE 2

## (undated) DEVICE — DOUBLE BASIN SET: Brand: MEDLINE INDUSTRIES, INC.